# Patient Record
Sex: FEMALE | Race: WHITE | Employment: FULL TIME | URBAN - METROPOLITAN AREA
[De-identification: names, ages, dates, MRNs, and addresses within clinical notes are randomized per-mention and may not be internally consistent; named-entity substitution may affect disease eponyms.]

---

## 2018-02-26 PROBLEM — I10 HTN (HYPERTENSION): Status: ACTIVE | Noted: 2018-02-26

## 2018-02-26 PROBLEM — I31.39 PERICARDIAL EFFUSION: Status: ACTIVE | Noted: 2018-02-26

## 2018-02-27 PROBLEM — R09.89 BRUIT OF RIGHT CAROTID ARTERY: Status: ACTIVE | Noted: 2018-02-27

## 2018-02-27 PROBLEM — R07.9 CHEST PAIN: Status: ACTIVE | Noted: 2018-02-27

## 2020-08-07 ENCOUNTER — HOSPITAL ENCOUNTER (OUTPATIENT)
Age: 75
Discharge: HOME | End: 2020-08-09

## 2020-08-07 PROCEDURE — 87081 CULTURE SCREEN ONLY: CPT

## 2020-08-07 PROCEDURE — 88305 TISSUE EXAM BY PATHOLOGIST: CPT

## 2020-08-08 LAB — CLOTEST: NORMAL

## 2023-01-10 ENCOUNTER — HOSPITAL ENCOUNTER (OUTPATIENT)
Age: 78
Discharge: HOME | End: 2023-01-12

## 2023-02-28 ENCOUNTER — HOSPITAL ENCOUNTER (INPATIENT)
Age: 78
LOS: 1 days | Discharge: HOME | DRG: 312 | End: 2023-03-01
Attending: EMERGENCY MEDICINE | Admitting: FAMILY MEDICINE
Payer: MEDICARE

## 2023-02-28 ENCOUNTER — APPOINTMENT (OUTPATIENT)
Dept: GENERAL RADIOLOGY | Age: 78
DRG: 312 | End: 2023-02-28
Payer: MEDICARE

## 2023-02-28 ENCOUNTER — APPOINTMENT (OUTPATIENT)
Dept: CT IMAGING | Age: 78
DRG: 312 | End: 2023-02-28
Payer: MEDICARE

## 2023-02-28 DIAGNOSIS — E87.1 HYPONATREMIA: Primary | ICD-10-CM

## 2023-02-28 DIAGNOSIS — E87.6 HYPOKALEMIA: ICD-10-CM

## 2023-02-28 DIAGNOSIS — R55 SYNCOPE AND COLLAPSE: ICD-10-CM

## 2023-02-28 PROBLEM — R53.1 WEAKNESS: Status: ACTIVE | Noted: 2023-02-28

## 2023-02-28 PROBLEM — E87.8 ELECTROLYTE ABNORMALITY: Status: ACTIVE | Noted: 2023-02-28

## 2023-02-28 LAB
ANION GAP SERPL CALCULATED.3IONS-SCNC: 7 MMOL/L (ref 7–16)
ANION GAP SERPL CALCULATED.3IONS-SCNC: 9 MMOL/L (ref 7–16)
BASOPHILS ABSOLUTE: 0.03 E9/L (ref 0–0.2)
BASOPHILS RELATIVE PERCENT: 0.5 % (ref 0–2)
BUN BLDV-MCNC: 11 MG/DL (ref 6–23)
BUN BLDV-MCNC: 14 MG/DL (ref 6–23)
CALCIUM SERPL-MCNC: 8 MG/DL (ref 8.6–10.2)
CALCIUM SERPL-MCNC: 8.9 MG/DL (ref 8.6–10.2)
CHLORIDE BLD-SCNC: 91 MMOL/L (ref 98–107)
CHLORIDE BLD-SCNC: 98 MMOL/L (ref 98–107)
CO2: 24 MMOL/L (ref 22–29)
CO2: 30 MMOL/L (ref 22–29)
CREAT SERPL-MCNC: 0.7 MG/DL (ref 0.5–1)
CREAT SERPL-MCNC: 0.9 MG/DL (ref 0.5–1)
D DIMER: 520 NG/ML DDU
EKG ATRIAL RATE: 58 BPM
EKG P AXIS: 36 DEGREES
EKG P-R INTERVAL: 188 MS
EKG Q-T INTERVAL: 490 MS
EKG QRS DURATION: 90 MS
EKG QTC CALCULATION (BAZETT): 481 MS
EKG R AXIS: 34 DEGREES
EKG T AXIS: 51 DEGREES
EKG VENTRICULAR RATE: 58 BPM
EOSINOPHILS ABSOLUTE: 0.01 E9/L (ref 0.05–0.5)
EOSINOPHILS RELATIVE PERCENT: 0.2 % (ref 0–6)
GFR SERPL CREATININE-BSD FRML MDRD: >60 ML/MIN/1.73
GFR SERPL CREATININE-BSD FRML MDRD: >60 ML/MIN/1.73
GLUCOSE BLD-MCNC: 102 MG/DL (ref 74–99)
GLUCOSE BLD-MCNC: 193 MG/DL (ref 74–99)
HCT VFR BLD CALC: 37.7 % (ref 34–48)
HEMOGLOBIN: 13 G/DL (ref 11.5–15.5)
IMMATURE GRANULOCYTES #: 0.03 E9/L
IMMATURE GRANULOCYTES %: 0.5 % (ref 0–5)
LYMPHOCYTES ABSOLUTE: 0.9 E9/L (ref 1.5–4)
LYMPHOCYTES RELATIVE PERCENT: 13.7 % (ref 20–42)
MAGNESIUM: 1.7 MG/DL (ref 1.6–2.6)
MCH RBC QN AUTO: 31 PG (ref 26–35)
MCHC RBC AUTO-ENTMCNC: 34.5 % (ref 32–34.5)
MCV RBC AUTO: 89.8 FL (ref 80–99.9)
MONOCYTES ABSOLUTE: 0.69 E9/L (ref 0.1–0.95)
MONOCYTES RELATIVE PERCENT: 10.5 % (ref 2–12)
NEUTROPHILS ABSOLUTE: 4.93 E9/L (ref 1.8–7.3)
NEUTROPHILS RELATIVE PERCENT: 74.6 % (ref 43–80)
PDW BLD-RTO: 12.3 FL (ref 11.5–15)
PLATELET # BLD: 195 E9/L (ref 130–450)
PMV BLD AUTO: 9.3 FL (ref 7–12)
POTASSIUM SERPL-SCNC: 3.1 MMOL/L (ref 3.5–5)
POTASSIUM SERPL-SCNC: 3.2 MMOL/L (ref 3.5–5)
RBC # BLD: 4.2 E12/L (ref 3.5–5.5)
SODIUM BLD-SCNC: 128 MMOL/L (ref 132–146)
SODIUM BLD-SCNC: 131 MMOL/L (ref 132–146)
TROPONIN, HIGH SENSITIVITY: 7 NG/L (ref 0–9)
WBC # BLD: 6.6 E9/L (ref 4.5–11.5)

## 2023-02-28 PROCEDURE — 2580000003 HC RX 258: Performed by: FAMILY MEDICINE

## 2023-02-28 PROCEDURE — 71045 X-RAY EXAM CHEST 1 VIEW: CPT

## 2023-02-28 PROCEDURE — 84484 ASSAY OF TROPONIN QUANT: CPT

## 2023-02-28 PROCEDURE — 6360000002 HC RX W HCPCS: Performed by: EMERGENCY MEDICINE

## 2023-02-28 PROCEDURE — 2060000000 HC ICU INTERMEDIATE R&B

## 2023-02-28 PROCEDURE — 80048 BASIC METABOLIC PNL TOTAL CA: CPT

## 2023-02-28 PROCEDURE — 83735 ASSAY OF MAGNESIUM: CPT

## 2023-02-28 PROCEDURE — 93005 ELECTROCARDIOGRAM TRACING: CPT | Performed by: EMERGENCY MEDICINE

## 2023-02-28 PROCEDURE — 70450 CT HEAD/BRAIN W/O DYE: CPT

## 2023-02-28 PROCEDURE — 36415 COLL VENOUS BLD VENIPUNCTURE: CPT

## 2023-02-28 PROCEDURE — 99285 EMERGENCY DEPT VISIT HI MDM: CPT

## 2023-02-28 PROCEDURE — 93010 ELECTROCARDIOGRAM REPORT: CPT | Performed by: INTERNAL MEDICINE

## 2023-02-28 PROCEDURE — 6370000000 HC RX 637 (ALT 250 FOR IP): Performed by: FAMILY MEDICINE

## 2023-02-28 PROCEDURE — 85025 COMPLETE CBC W/AUTO DIFF WBC: CPT

## 2023-02-28 PROCEDURE — 6370000000 HC RX 637 (ALT 250 FOR IP): Performed by: EMERGENCY MEDICINE

## 2023-02-28 PROCEDURE — 2580000003 HC RX 258: Performed by: EMERGENCY MEDICINE

## 2023-02-28 PROCEDURE — 85378 FIBRIN DEGRADE SEMIQUANT: CPT

## 2023-02-28 RX ORDER — ROSUVASTATIN CALCIUM 20 MG/1
20 TABLET, COATED ORAL NIGHTLY
COMMUNITY

## 2023-02-28 RX ORDER — MAGNESIUM SULFATE IN WATER 40 MG/ML
2000 INJECTION, SOLUTION INTRAVENOUS ONCE
Status: COMPLETED | OUTPATIENT
Start: 2023-02-28 | End: 2023-02-28

## 2023-02-28 RX ORDER — ASPIRIN 81 MG/1
81 TABLET ORAL EVERY MORNING
Status: DISCONTINUED | OUTPATIENT
Start: 2023-03-01 | End: 2023-03-01 | Stop reason: HOSPADM

## 2023-02-28 RX ORDER — MULTIVITAMIN WITH IRON
1 TABLET ORAL DAILY
Status: DISCONTINUED | OUTPATIENT
Start: 2023-02-28 | End: 2023-03-01 | Stop reason: HOSPADM

## 2023-02-28 RX ORDER — POTASSIUM CHLORIDE 20 MEQ/1
40 TABLET, EXTENDED RELEASE ORAL ONCE
Status: COMPLETED | OUTPATIENT
Start: 2023-02-28 | End: 2023-02-28

## 2023-02-28 RX ORDER — PANTOPRAZOLE SODIUM 40 MG/1
40 TABLET, DELAYED RELEASE ORAL
Status: DISCONTINUED | OUTPATIENT
Start: 2023-03-01 | End: 2023-03-01 | Stop reason: HOSPADM

## 2023-02-28 RX ORDER — BUSPIRONE HYDROCHLORIDE 5 MG/1
7.5 TABLET ORAL 2 TIMES DAILY
Status: DISCONTINUED | OUTPATIENT
Start: 2023-02-28 | End: 2023-03-01 | Stop reason: HOSPADM

## 2023-02-28 RX ORDER — SODIUM CHLORIDE 9 MG/ML
INJECTION, SOLUTION INTRAVENOUS CONTINUOUS
Status: DISCONTINUED | OUTPATIENT
Start: 2023-02-28 | End: 2023-03-01 | Stop reason: HOSPADM

## 2023-02-28 RX ORDER — POTASSIUM CHLORIDE 7.45 MG/ML
10 INJECTION INTRAVENOUS ONCE
Status: COMPLETED | OUTPATIENT
Start: 2023-02-28 | End: 2023-02-28

## 2023-02-28 RX ORDER — 0.9 % SODIUM CHLORIDE 0.9 %
2000 INTRAVENOUS SOLUTION INTRAVENOUS ONCE
Status: COMPLETED | OUTPATIENT
Start: 2023-02-28 | End: 2023-02-28

## 2023-02-28 RX ORDER — CITALOPRAM 20 MG/1
10 TABLET ORAL NIGHTLY
Status: DISCONTINUED | OUTPATIENT
Start: 2023-02-28 | End: 2023-03-01 | Stop reason: HOSPADM

## 2023-02-28 RX ORDER — OMEPRAZOLE 40 MG/1
40 CAPSULE, DELAYED RELEASE ORAL EVERY MORNING
COMMUNITY

## 2023-02-28 RX ORDER — VITAMIN B COMPLEX
100 TABLET ORAL EVERY MORNING
Status: DISCONTINUED | OUTPATIENT
Start: 2023-03-01 | End: 2023-02-28 | Stop reason: DRUGHIGH

## 2023-02-28 RX ORDER — MULTIVIT-MIN/FOLIC ACID/BIOTIN 200-300MCG
1 TABLET,CHEWABLE ORAL EVERY MORNING
Status: DISCONTINUED | OUTPATIENT
Start: 2023-03-01 | End: 2023-02-28 | Stop reason: CLARIF

## 2023-02-28 RX ORDER — BUSPIRONE HYDROCHLORIDE 15 MG/1
7.5 TABLET ORAL 2 TIMES DAILY
COMMUNITY

## 2023-02-28 RX ORDER — MULTIVIT-MIN/FOLIC ACID/BIOTIN 200-300MCG
1 TABLET,CHEWABLE ORAL EVERY MORNING
COMMUNITY

## 2023-02-28 RX ORDER — CARVEDILOL 6.25 MG/1
12.5 TABLET ORAL 2 TIMES DAILY
Status: DISCONTINUED | OUTPATIENT
Start: 2023-02-28 | End: 2023-03-01 | Stop reason: HOSPADM

## 2023-02-28 RX ORDER — ROSUVASTATIN CALCIUM 20 MG/1
20 TABLET, COATED ORAL NIGHTLY
Status: DISCONTINUED | OUTPATIENT
Start: 2023-02-28 | End: 2023-03-01 | Stop reason: HOSPADM

## 2023-02-28 RX ORDER — ASPIRIN 81 MG/1
81 TABLET ORAL EVERY MORNING
COMMUNITY

## 2023-02-28 RX ORDER — VITAMIN B COMPLEX
100 TABLET ORAL EVERY MORNING
COMMUNITY

## 2023-02-28 RX ORDER — CITALOPRAM 10 MG/1
10 TABLET ORAL NIGHTLY
COMMUNITY

## 2023-02-28 RX ADMIN — CARVEDILOL 12.5 MG: 6.25 TABLET, FILM COATED ORAL at 20:47

## 2023-02-28 RX ADMIN — POTASSIUM CHLORIDE 10 MEQ: 7.46 INJECTION, SOLUTION INTRAVENOUS at 14:46

## 2023-02-28 RX ADMIN — POTASSIUM CHLORIDE 40 MEQ: 1500 TABLET, EXTENDED RELEASE ORAL at 14:35

## 2023-02-28 RX ADMIN — MULTIVITAMIN TABLET 1 TABLET: TABLET at 18:16

## 2023-02-28 RX ADMIN — SODIUM CHLORIDE: 9 INJECTION, SOLUTION INTRAVENOUS at 18:11

## 2023-02-28 RX ADMIN — MAGNESIUM SULFATE HEPTAHYDRATE 2000 MG: 40 INJECTION, SOLUTION INTRAVENOUS at 14:43

## 2023-02-28 RX ADMIN — POTASSIUM CHLORIDE 10 MEQ: 7.46 INJECTION, SOLUTION INTRAVENOUS at 17:33

## 2023-02-28 RX ADMIN — BUSPIRONE HYDROCHLORIDE 7.5 MG: 5 TABLET ORAL at 18:14

## 2023-02-28 RX ADMIN — CITALOPRAM HYDROBROMIDE 10 MG: 20 TABLET ORAL at 20:47

## 2023-02-28 RX ADMIN — ROSUVASTATIN CALCIUM 20 MG: 20 TABLET, FILM COATED ORAL at 20:47

## 2023-02-28 RX ADMIN — SODIUM CHLORIDE 2000 ML: 9 INJECTION, SOLUTION INTRAVENOUS at 11:39

## 2023-02-28 RX ADMIN — POTASSIUM CHLORIDE 10 MEQ: 7.46 INJECTION, SOLUTION INTRAVENOUS at 16:00

## 2023-02-28 ASSESSMENT — PAIN - FUNCTIONAL ASSESSMENT
PAIN_FUNCTIONAL_ASSESSMENT: NONE - DENIES PAIN
PAIN_FUNCTIONAL_ASSESSMENT: NONE - DENIES PAIN

## 2023-02-28 ASSESSMENT — LIFESTYLE VARIABLES
HOW OFTEN DO YOU HAVE A DRINK CONTAINING ALCOHOL: NEVER
HOW MANY STANDARD DRINKS CONTAINING ALCOHOL DO YOU HAVE ON A TYPICAL DAY: PATIENT DOES NOT DRINK

## 2023-02-28 NOTE — ED PROVIDER NOTES
HPI:  2/28/23,   Time: 11:15 AM DERICK Plummer is a 69 y.o. female presenting to the ED for Dizziness and syncope X 2 , beginning 2 days ago. The complaint has been persistent, mild in severity, and worsened by light exertion. Patient states she was out walking when she became lightheaded dizzy had 2 syncopal events she does not recall what happened afterwards. These were witnessed by family. Patient's had decreased oral intake per daughter. She has no fevers chills or melena. She does report some loose stool/diarrhea. No close contacts are ill. She denies chest pain shortness of breath pleuritic pain or abdominal pain. Review of Systems:   Pertinent positives and negatives are stated within HPI, all other systems reviewed and are negative.    --------------------------------------------- PAST HISTORY ---------------------------------------------  Past Medical History:  has a past medical history of Carotid stenosis, bilateral, Hypertension, and Left carotid stenosis. Past Surgical History:  has a past surgical history that includes Appendectomy; Hysterectomy; Foot surgery; Tonsillectomy; Neck surgery; hernia repair (2015); and shoulder surgery (Right, 12/2015). Social History:  reports that she quit smoking about 15 years ago. Her smoking use included cigarettes. She has a 10.00 pack-year smoking history. She has never used smokeless tobacco. She reports that she does not drink alcohol and does not use drugs. Family History: family history is not on file. The patients home medications have been reviewed. Allergies: Patient has no known allergies.     ---------------------------------------------------PHYSICAL EXAM--------------------------------------    Constitutional/General: Alert and oriented x3, well appearing, non toxic in NAD  Head: Normocephalic and atraumatic  Eyes: PERRL, EOMI, conjunctive normal, sclera non icteric  Mouth: Oropharynx clear, handling secretions, no trismus, no asymmetry of the posterior oropharynx or uvular edema  Neck: Supple, full ROM, non tender to palpation in the midline, no stridor, no crepitus, no meningeal signs  Respiratory: Lungs clear to auscultation bilaterally, no wheezes, rales, or rhonchi. Not in respiratory distress  Cardiovascular:  Regular rate. Regular rhythm. No murmurs, gallops, or rubs. 2+ distal pulses  Chest: No chest wall tenderness  GI:  Abdomen Soft, Non tender, Non distended. +BS. No organomegaly, no palpable masses,  No rebound, guarding, or rigidity. Musculoskeletal: Moves all extremities x 4. Warm and well perfused, no clubbing, cyanosis, or edema. Capillary refill <3 seconds  Integument: skin warm and dry. No rashes. Lymphatic: no lymphadenopathy noted  Neurologic: GCS 15, no focal deficits, symmetric strength 5/5 in the upper and lower extremities bilaterally  Psychiatric: Normal Affect    -------------------------------------------------- RESULTS -------------------------------------------------  I have personally reviewed all laboratory and imaging results for this patient. Results are listed below.      LABS:  Results for orders placed or performed during the hospital encounter of 02/28/23   CBC with Auto Differential   Result Value Ref Range    WBC 6.6 4.5 - 11.5 E9/L    RBC 4.20 3.50 - 5.50 E12/L    Hemoglobin 13.0 11.5 - 15.5 g/dL    Hematocrit 37.7 34.0 - 48.0 %    MCV 89.8 80.0 - 99.9 fL    MCH 31.0 26.0 - 35.0 pg    MCHC 34.5 32.0 - 34.5 %    RDW 12.3 11.5 - 15.0 fL    Platelets 765 667 - 128 E9/L    MPV 9.3 7.0 - 12.0 fL    Neutrophils % 74.6 43.0 - 80.0 %    Immature Granulocytes % 0.5 0.0 - 5.0 %    Lymphocytes % 13.7 (L) 20.0 - 42.0 %    Monocytes % 10.5 2.0 - 12.0 %    Eosinophils % 0.2 0.0 - 6.0 %    Basophils % 0.5 0.0 - 2.0 %    Neutrophils Absolute 4.93 1.80 - 7.30 E9/L    Immature Granulocytes # 0.03 E9/L    Lymphocytes Absolute 0.90 (L) 1.50 - 4.00 E9/L    Monocytes Absolute 0.69 0.10 - 0.95 E9/L Eosinophils Absolute 0.01 (L) 0.05 - 0.50 E9/L    Basophils Absolute 0.03 0.00 - 0.20 E9/L   Troponin   Result Value Ref Range    Troponin, High Sensitivity 7 0 - 9 ng/L   Basic Metabolic Panel   Result Value Ref Range    Sodium 128 (L) 132 - 146 mmol/L    Potassium 3.1 (L) 3.5 - 5.0 mmol/L    Chloride 91 (L) 98 - 107 mmol/L    CO2 30 (H) 22 - 29 mmol/L    Anion Gap 7 7 - 16 mmol/L    Glucose 102 (H) 74 - 99 mg/dL    BUN 14 6 - 23 mg/dL    Creatinine 0.9 0.5 - 1.0 mg/dL    Est, Glom Filt Rate >60 >=60 mL/min/1.73    Calcium 8.9 8.6 - 10.2 mg/dL   D-Dimer, Quantitative   Result Value Ref Range    D-Dimer, Quant 520 ng/mL DDU   Magnesium   Result Value Ref Range    Magnesium 1.7 1.6 - 2.6 mg/dL   Basic Metabolic Panel   Result Value Ref Range    Sodium 131 (L) 132 - 146 mmol/L    Potassium 3.2 (L) 3.5 - 5.0 mmol/L    Chloride 98 98 - 107 mmol/L    CO2 24 22 - 29 mmol/L    Anion Gap 9 7 - 16 mmol/L    Glucose 193 (H) 74 - 99 mg/dL    BUN 11 6 - 23 mg/dL    Creatinine 0.7 0.5 - 1.0 mg/dL    Est, Glom Filt Rate >60 >=60 mL/min/1.73    Calcium 8.0 (L) 8.6 - 10.2 mg/dL   Basic Metabolic Panel   Result Value Ref Range    Sodium 132 132 - 146 mmol/L    Potassium 3.0 (L) 3.5 - 5.0 mmol/L    Chloride 99 98 - 107 mmol/L    CO2 24 22 - 29 mmol/L    Anion Gap 9 7 - 16 mmol/L    Glucose 92 74 - 99 mg/dL    BUN 9 6 - 23 mg/dL    Creatinine 0.7 0.5 - 1.0 mg/dL    Est, Glom Filt Rate >60 >=60 mL/min/1.73    Calcium 7.7 (L) 8.6 - 10.2 mg/dL   EKG 12 Lead   Result Value Ref Range    Ventricular Rate 58 BPM    Atrial Rate 58 BPM    P-R Interval 188 ms    QRS Duration 90 ms    Q-T Interval 490 ms    QTc Calculation (Bazett) 481 ms    P Axis 36 degrees    R Axis 34 degrees    T Axis 51 degrees       RADIOLOGY:  Interpreted by Radiologist.  XR CHEST PORTABLE   Final Result   1. Cardiomegaly. There are no findings of failure or pneumonia. CT HEAD WO CONTRAST   Final Result   1. There is no acute intracranial abnormality. Specifically, there is no   intracranial hemorrhage. 2. Atrophy and periventricular leukomalacia,         US CAROTID ARTERY BILATERAL    (Results Pending)       EKG: This EKG is signed and interpreted by the EP, Dr. Franny Maldonado     Time: 1059  Rate: 58  Rhythm: Sinus  Interpretation: no acute changes and non-specific EKG  Comparison: stable as compared to patient's most recent EKG      ------------------------- NURSING NOTES AND VITALS REVIEWED ---------------------------   The nursing notes within the ED encounter and vital signs as below have been reviewed by myself. BP (!) 155/88   Pulse 62   Temp 98.6 °F (37 °C) (Oral)   Resp 16   Ht 5' 5\" (1.651 m)   Wt 168 lb 4.8 oz (76.3 kg)   SpO2 97%   BMI 28.01 kg/m²   Oxygen Saturation Interpretation: Normal    The patients available past medical records and past encounters were reviewed.         ------------------------------ ED COURSE/MEDICAL DECISION MAKING----------------------  Medications   aspirin EC tablet 81 mg (81 mg Oral Given 3/1/23 0747)   busPIRone (BUSPAR) tablet 7.5 mg (7.5 mg Oral Given 3/1/23 0747)   carvedilol (COREG) tablet 12.5 mg (12.5 mg Oral Given 3/1/23 0746)   citalopram (CELEXA) tablet 10 mg (10 mg Oral Given 2/28/23 2047)   pantoprazole (PROTONIX) tablet 40 mg (40 mg Oral Given 3/1/23 0625)   rosuvastatin (CRESTOR) tablet 20 mg (20 mg Oral Given 2/28/23 2047)   0.9 % sodium chloride infusion (0 mL/hr IntraVENous Stopped 3/1/23 1017)   multivitamin 1 tablet (1 tablet Oral Given 3/1/23 0748)   0.9 % sodium chloride bolus (0 mLs IntraVENous Stopped 2/28/23 1559)   potassium chloride 10 mEq/100 mL IVPB (Peripheral Line) ( IntraVENous Rate/Dose Change 2/28/23 1600)   potassium chloride 10 mEq/100 mL IVPB (Peripheral Line) ( IntraVENous Stopped 2/28/23 2042)   potassium chloride 10 mEq/100 mL IVPB (Peripheral Line) ( IntraVENous Rate/Dose Change 2/28/23 1731)   potassium chloride (KLOR-CON M) extended release tablet 40 mEq (40 mEq Oral Given 23 1435)   magnesium sulfate 2000 mg in 50 mL IVPB premix (0 mg IntraVENous Stopped 23 1658)   potassium chloride (KLOR-CON M) extended release tablet 40 mEq (40 mEq Oral Given 3/1/23 0747)             Medical Decision Makin yo F presents with daughter had to help syncopal events since Saturday. Complains of generalized weakness had some watery diarrhea. Did not hit her head. This was witnessed. She denies chest pain. History From: Daughter and patient    CC/HPI Summary, DDx, ED Course, Reassessment, Tests Considered, Patient expectation:   Differential diagnosis includes but not limited to seizure, arrhythmia, orthostasis was patient was orthostatic positive. Cute coronary syndrome. Social Determinants affecting Dx or Tx: Patient has no social determinants    Chronic Conditions: Include rotted stenosis and hypertension    Records Reviewed: 3/1/2018 patient had 2D echocardiogram, scheduled by Dr. Nathalia Farris. Was not completed. 2018 patient did have a nuclear medicine myocardial SPECT exam: No evidence of stress-induced myocardial ischemia noted. Ejection fraction was 65%. I am the Primary Clinician of Record. DISPOSITION: Labs were ordered and reviewed by me. Patient was concern for hypokalemia potassium 3.0 she was given IV and oral potassium. Calcium was also low at 7.7. He was also found to have hyponatremia with a sodium of 128. Patient be admitted to telemetry for electrolyte imbalance hyponatremia and fatigue as well as syncopal episode and orthostatic positive. ED COURSE:  ED Course as of 23 1025   e 2023   141 Discussed admission with Dr. Venkata Lance. [JN]   6418 Patient's potassium was replaced. Initial potassium was 3.1. Sodium was 128. Her CBC was unremarkable. Patient's vital signs remained stable. She will be admitted to telemetry.  [JN]      ED Course User Index  [JN] Liborio Madrid, DO       This patient's ED course included: a personal history and physicial examination, re-evaluation prior to disposition, multiple bedside re-evaluations, IV medications, cardiac monitoring, continuous pulse oximetry, and complex medical decision making and emergency management    This patient has remained hemodynamically stable, improved, and been closely monitored during their ED course. Re-Evaluations:             Re-evaluation. Patients symptoms are improving    Re-examination  2/28/23   11:15 AM EST    Consultations:             Dr. Madhu Bacon: NONE    Counseling: The emergency provider has spoken with the patient and family member patient and daughter and discussed todays results, in addition to providing specific details for the plan of care and counseling regarding the diagnosis and prognosis. Questions are answered at this time and they are agreeable with the plan.       --------------------------------- IMPRESSION AND DISPOSITION ---------------------------------    IMPRESSION  1. Hyponatremia    2. Syncope and collapse    3. Hypokalemia        DISPOSITION  Disposition: Admit to telemetry  Patient condition is serious    NOTE: This report was transcribed using voice recognition software.  Every effort was made to ensure accuracy; however, inadvertent computerized transcription errors may be present       Tamiko Womack DO  03/01/23 1025

## 2023-02-28 NOTE — ED NOTES
ED to Inpatient Handoff Report    Notified Meena Hook that electronic handoff available and patient ready for transport to room 607. Safety Risks: None identified    Patient in Restraints: no    Constant Observer or Patient : no    Telemetry Monitoring Ordered :Yes           Order to transfer to unit without monitor:NO    Last MEWS: 1 Time completed: 1452    Vitals:    02/28/23 0921 02/28/23 1007 02/28/23 1141 02/28/23 1437   BP: (!) 127/115 123/84 125/80 (!) 164/80   Pulse: 85 57 59 65   Resp: 14 16 20 20   Temp: 98.5 °F (36.9 °C)   98.1 °F (36.7 °C)   TempSrc: Oral   Oral   SpO2: 95% 94% 98% 95%   Weight: 160 lb (72.6 kg)      Height: 5' 4\" (1.626 m)          Opportunity for questions and clarification was provided.         Pretty Blanco RN  02/28/23 9043

## 2023-02-28 NOTE — PROGRESS NOTES
Database initiated. Patient is A&O independent from home with sister. States she uses no assistive devices and is RA at baseline.

## 2023-02-28 NOTE — PROGRESS NOTES
Pharmacy Note    Karla Foley was ordered Co-Enzyme Q-10 capsules. As per the [address] [place] [address], herbals and certain dietary supplements will be discontinued.   The herbal or dietary supplement may be continued after discharge from the hospital.

## 2023-03-01 ENCOUNTER — APPOINTMENT (OUTPATIENT)
Dept: ULTRASOUND IMAGING | Age: 78
DRG: 312 | End: 2023-03-01
Payer: MEDICARE

## 2023-03-01 VITALS
HEIGHT: 65 IN | WEIGHT: 168.3 LBS | TEMPERATURE: 98.6 F | HEART RATE: 62 BPM | DIASTOLIC BLOOD PRESSURE: 88 MMHG | OXYGEN SATURATION: 97 % | RESPIRATION RATE: 16 BRPM | BODY MASS INDEX: 28.04 KG/M2 | SYSTOLIC BLOOD PRESSURE: 155 MMHG

## 2023-03-01 LAB
ANION GAP SERPL CALCULATED.3IONS-SCNC: 6 MMOL/L (ref 7–16)
ANION GAP SERPL CALCULATED.3IONS-SCNC: 9 MMOL/L (ref 7–16)
BUN BLDV-MCNC: 10 MG/DL (ref 6–23)
BUN BLDV-MCNC: 9 MG/DL (ref 6–23)
CALCIUM SERPL-MCNC: 7.7 MG/DL (ref 8.6–10.2)
CALCIUM SERPL-MCNC: 8.2 MG/DL (ref 8.6–10.2)
CHLORIDE BLD-SCNC: 101 MMOL/L (ref 98–107)
CHLORIDE BLD-SCNC: 99 MMOL/L (ref 98–107)
CO2: 24 MMOL/L (ref 22–29)
CO2: 26 MMOL/L (ref 22–29)
CREAT SERPL-MCNC: 0.7 MG/DL (ref 0.5–1)
CREAT SERPL-MCNC: 0.7 MG/DL (ref 0.5–1)
GFR SERPL CREATININE-BSD FRML MDRD: >60 ML/MIN/1.73
GFR SERPL CREATININE-BSD FRML MDRD: >60 ML/MIN/1.73
GLUCOSE BLD-MCNC: 128 MG/DL (ref 74–99)
GLUCOSE BLD-MCNC: 92 MG/DL (ref 74–99)
POTASSIUM SERPL-SCNC: 3 MMOL/L (ref 3.5–5)
POTASSIUM SERPL-SCNC: 3.2 MMOL/L (ref 3.5–5)
SODIUM BLD-SCNC: 132 MMOL/L (ref 132–146)
SODIUM BLD-SCNC: 133 MMOL/L (ref 132–146)

## 2023-03-01 PROCEDURE — 6370000000 HC RX 637 (ALT 250 FOR IP): Performed by: FAMILY MEDICINE

## 2023-03-01 PROCEDURE — 93880 EXTRACRANIAL BILAT STUDY: CPT

## 2023-03-01 PROCEDURE — 80048 BASIC METABOLIC PNL TOTAL CA: CPT

## 2023-03-01 PROCEDURE — 36415 COLL VENOUS BLD VENIPUNCTURE: CPT

## 2023-03-01 RX ORDER — POTASSIUM CHLORIDE 20 MEQ/1
40 TABLET, EXTENDED RELEASE ORAL ONCE
Status: COMPLETED | OUTPATIENT
Start: 2023-03-01 | End: 2023-03-01

## 2023-03-01 RX ORDER — POTASSIUM CHLORIDE 20 MEQ/1
40 TABLET, EXTENDED RELEASE ORAL DAILY
Qty: 60 TABLET | Refills: 5 | Status: SHIPPED | OUTPATIENT
Start: 2023-03-01 | End: 2023-03-01 | Stop reason: SDUPTHER

## 2023-03-01 RX ORDER — POTASSIUM CHLORIDE 20 MEQ/1
20 TABLET, EXTENDED RELEASE ORAL ONCE
Status: DISCONTINUED | OUTPATIENT
Start: 2023-03-01 | End: 2023-03-01

## 2023-03-01 RX ORDER — VALSARTAN 160 MG/1
160 TABLET ORAL DAILY
Qty: 30 TABLET | Refills: 5 | Status: SHIPPED | OUTPATIENT
Start: 2023-03-01

## 2023-03-01 RX ORDER — VALSARTAN 160 MG/1
160 TABLET ORAL DAILY
Qty: 30 TABLET | Refills: 5 | Status: SHIPPED | OUTPATIENT
Start: 2023-03-01 | End: 2023-03-01 | Stop reason: SDUPTHER

## 2023-03-01 RX ORDER — POTASSIUM CHLORIDE 20 MEQ/1
40 TABLET, EXTENDED RELEASE ORAL DAILY
Qty: 60 TABLET | Refills: 5 | Status: SHIPPED | OUTPATIENT
Start: 2023-03-01

## 2023-03-01 RX ADMIN — CARVEDILOL 12.5 MG: 6.25 TABLET, FILM COATED ORAL at 07:46

## 2023-03-01 RX ADMIN — PANTOPRAZOLE SODIUM 40 MG: 40 TABLET, DELAYED RELEASE ORAL at 06:25

## 2023-03-01 RX ADMIN — BUSPIRONE HYDROCHLORIDE 7.5 MG: 5 TABLET ORAL at 07:47

## 2023-03-01 RX ADMIN — POTASSIUM CHLORIDE 40 MEQ: 1500 TABLET, EXTENDED RELEASE ORAL at 07:47

## 2023-03-01 RX ADMIN — MULTIVITAMIN TABLET 1 TABLET: TABLET at 07:48

## 2023-03-01 RX ADMIN — ASPIRIN 81 MG: 81 TABLET, COATED ORAL at 07:47

## 2023-03-01 ASSESSMENT — PAIN SCALES - GENERAL: PAINLEVEL_OUTOF10: 0

## 2023-03-01 NOTE — H&P
[id number] [address]                              HISTORY AND PHYSICAL    PATIENT NAME: Jonathan Barrera                    :        1945  MED REC NO:   [id number]                            ROOM:       8809  ACCOUNT NO:   [de-identified]                           ADMIT DATE: 2023  PROVIDER:     Germain Salinas DO    CHIEF COMPLAINT:  Weakness, fatigue, and syncopal episode. HISTORY OF PRESENT ILLNESS:  This is a 60-year-old female presented [address] [place] [address] Emergency Room after having experienced a syncopal  episode on Saturday prior to this admission. The patient states that  for one week prior to this admission, she had been experiencing  increasing and persistent weakness and fatigue as well as dizziness. On  the Saturday prior to this admission, she was coming through a door,  lost consciousness, falling onto her left arm, and sustaining a  contusion. She was arousable. States that she hit head during the  fall, however, has had no persisting headache, blurred vision, ataxia,  or vertigo since the fall. Three days following the fall, she  ultimately decided to come to the emergency room for further evaluation. While in the emergency room, vital signs were performed and was  revealing her diastolic blood pressure to be elevated at 751 with  systolic at 428. She was saturating at 95% on room air. Remainder of  vital signs stable. Laboratory studies were performed including CBC,  which revealed a normal white cell count at 6.6 with hemoglobin and  hematocrit stable at 13.0 and 37.7 respectively. Her platelet cell  counts were stable at 195. Her BMP was showing abnormalities with  sodium low at 128, potassium low at 3.1, glucose slightly high at 102,  and chloride low at 91. Troponin was stable at 7. D-dimer stable at  520.   CT scan of the head revealed no acute intracranial abnormality,  specifically no intracranial hemorrhage was noted. Atrophy and  periventricular leukomalacia was noted. Chest x-ray  Was showing  cardiomegaly, however, no other acute findings of failure or pneumonia  were identified. EKG while in the emergency room was showing  nonspecific ST and T-wave abnormalities with sinus bradycardia and  lengthened QT interval.  The patient was started on IV fluids and were  given replacement of potassium and magnesium and was admitted for  observation on the basis of her syncopal episode with electrolyte  abnormalities. I was able to see her the afternoon after admission. She denies having had any preexisting chest pain, shortness of breath,  abdominal pain, nausea, vomiting, diarrhea, constipation, melena,  hematochezia, dysuria, urgency, or frequency. She has had no recent  fever or chills. Does admit to cough with chest congestion, however, no  sore throat or significant headache. The patient believes that she  developed a virus approximately one week prior to her syncopal episode  in that this had resulted in her passing out. Nonetheless, she denies  any other preexisting symptoms other than weakness, fatigue, and cough. PAST MEDICAL HISTORY:  Consists of hypertension and carotid stenosis. PAST SURGICAL HISTORY:  Appendectomy, hernia repair, hysterectomy, neck  tumor excision, shoulder surgery right side, and tonsillectomy. MEDICATIONS:  See chart. ALLERGIES:  No known drug allergies. SOCIAL HISTORY:  Former smoker of half pack per day for 10 years. No  recreational drug or alcohol abuse noted. FAMILY HISTORY:  No family history noted. REVIEW OF SYSTEMS:  Negative unless stated in above history. PHYSICAL EXAMINATION:  VITAL SIGNS:  Temperature 98.4, pulse 63, respirations 20, blood  pressure elevated now at 191/89 with oxygen saturation 98% on room air.   GENERAL:  Well-developed, well-nourished elderly female, who appears her  stated age if not younger. She is alert and oriented, answers questions  appropriately, and is friendly and willing to cooperate with physical  examination. She is presently in no acute distress. HEENT:  Atraumatic and normocephalic. Pupils are equal, round, and  reactive to light. Extraocular movements are intact. Nares are patent. External auditory canals are nonerythematous. Pharynx is clear. NECK:  Supple. No bruits. No cervical lymphadenopathy. HEART:  Regular rate and rhythm. 2/6 systolic murmur. LUNGS:  Clear to auscultation bilaterally without wheezes or rales. Few  crackles noted in upper airways. ABDOMEN:  Soft and nontender with bowel sounds present in all four  quadrants. EXTREMITIES:  Full range of motion. 5/5 strength. No edema in upper or  lower extremities bilaterally. Hematoma right distal triceps area,  however, range of motion and strength intact. NEUROLOGIC:  Cranial nerves II through XII are grossly intact. No focal  neurologic deficits noted. MUSCULOSKELETAL:  No excessive lordosis, kyphosis,  or scoliosis. No  gross bony or soft tissue asymmetry. ASSESSMENT:  1. Syncopal episode. 2.  Weakness. 3.  Electrolyte abnormalities. PLAN:  The patient will be admitted for observation. Vitals q.4 hours. Activity ad jaime. I's and O's regularly. Diet regular without any added  salt. IV fluids of normal saline at 75 per hour. We will follow BMP  every eight hours and we will check carotid ultrasound. Reassess the  patient in the a.m. She is essentially free of any symptoms other than  fatigue and therefore we would hope that rehydration will reestablish  her baseline. We will reassess in the a.m. to determine if the patient  is stable for discharge. For any further orders, please see chart.         Robyn Sutherland DO    D: 02/28/2023 17:49:37       T: 02/28/2023 17:53:32     [email or web address]  Job#: [id number]     Doc#: [id number]    CC:

## 2023-03-01 NOTE — PLAN OF CARE
Problem: Discharge Planning  Goal: Discharge to home or other facility with appropriate resources  Outcome: Progressing     Problem: Safety - Adult  Goal: Free from fall injury  Outcome: Progressing     Problem: Skin/Tissue Integrity - Adult  Goal: Skin integrity remains intact  Outcome: Progressing     Problem: Musculoskeletal - Adult  Goal: Return mobility to safest level of function  Outcome: Progressing  Goal: Return ADL status to a safe level of function  Outcome: Progressing     Problem: Metabolic/Fluid and Electrolytes - Adult  Goal: Electrolytes maintained within normal limits  Outcome: Progressing

## 2023-03-01 NOTE — PROGRESS NOTES
Admit Date: 2/28/2023  Hospital day 2    Subjective:     Patient pt resting in bed in NAD. Still tired but otherwise says she is feeling well. Denies CP, SOB, abd pain, N/V/D/C, HA, dysuria, melena, hematochezia. Family says she has not been eating well but pt denies any issues with eating including dysphagia. Objective:       No intake/output data recorded.       BP (!) 155/88   Pulse 62   Temp 98.6 °F (37 °C) (Oral)   Resp 16   Ht 5' 5\" (1.651 m)   Wt 168 lb 4.8 oz (76.3 kg)   SpO2 97%   BMI 28.01 kg/m²   General appearance: alert, appears stated age, cooperative, and no distress  Lungs: clear to auscultation bilaterally  Heart: regular rate and rhythm, S1, S2 normal, no murmur, click, rub or gallop  Abdomen: soft, non-tender; bowel sounds normal; no masses,  no organomegaly  Extremities: extremities normal, atraumatic, no cyanosis or edema  Skin: Skin color, texture, turgor normal. No rashes or lesions       Data ReviewCBC:       Recent Results (from the past 24 hour(s))   CBC with Auto Differential    Collection Time: 02/28/23 10:14 AM   Result Value Ref Range    WBC 6.6 4.5 - 11.5 E9/L    RBC 4.20 3.50 - 5.50 E12/L    Hemoglobin 13.0 11.5 - 15.5 g/dL    Hematocrit 37.7 34.0 - 48.0 %    MCV 89.8 80.0 - 99.9 fL    MCH 31.0 26.0 - 35.0 pg    MCHC 34.5 32.0 - 34.5 %    RDW 12.3 11.5 - 15.0 fL    Platelets 660 311 - 522 E9/L    MPV 9.3 7.0 - 12.0 fL    Neutrophils % 74.6 43.0 - 80.0 %    Immature Granulocytes % 0.5 0.0 - 5.0 %    Lymphocytes % 13.7 (L) 20.0 - 42.0 %    Monocytes % 10.5 2.0 - 12.0 %    Eosinophils % 0.2 0.0 - 6.0 %    Basophils % 0.5 0.0 - 2.0 %    Neutrophils Absolute 4.93 1.80 - 7.30 E9/L    Immature Granulocytes # 0.03 E9/L    Lymphocytes Absolute 0.90 (L) 1.50 - 4.00 E9/L    Monocytes Absolute 0.69 0.10 - 0.95 E9/L    Eosinophils Absolute 0.01 (L) 0.05 - 0.50 E9/L    Basophils Absolute 0.03 0.00 - 0.20 E9/L   Troponin    Collection Time: 02/28/23 10:14 AM   Result Value Ref Range Troponin, High Sensitivity 7 0 - 9 ng/L   Basic Metabolic Panel    Collection Time: 02/28/23 10:14 AM   Result Value Ref Range    Sodium 128 (L) 132 - 146 mmol/L    Potassium 3.1 (L) 3.5 - 5.0 mmol/L    Chloride 91 (L) 98 - 107 mmol/L    CO2 30 (H) 22 - 29 mmol/L    Anion Gap 7 7 - 16 mmol/L    Glucose 102 (H) 74 - 99 mg/dL    BUN 14 6 - 23 mg/dL    Creatinine 0.9 0.5 - 1.0 mg/dL    Est, Glom Filt Rate >60 >=60 mL/min/1.73    Calcium 8.9 8.6 - 10.2 mg/dL   D-Dimer, Quantitative    Collection Time: 02/28/23 10:14 AM   Result Value Ref Range    D-Dimer, Quant 520 ng/mL DDU   EKG 12 Lead    Collection Time: 02/28/23 10:59 AM   Result Value Ref Range    Ventricular Rate 58 BPM    Atrial Rate 58 BPM    P-R Interval 188 ms    QRS Duration 90 ms    Q-T Interval 490 ms    QTc Calculation (Bazett) 481 ms    P Axis 36 degrees    R Axis 34 degrees    T Axis 51 degrees   Magnesium    Collection Time: 02/28/23  2:37 PM   Result Value Ref Range    Magnesium 1.7 1.6 - 2.6 mg/dL   Basic Metabolic Panel    Collection Time: 02/28/23  6:00 PM   Result Value Ref Range    Sodium 131 (L) 132 - 146 mmol/L    Potassium 3.2 (L) 3.5 - 5.0 mmol/L    Chloride 98 98 - 107 mmol/L    CO2 24 22 - 29 mmol/L    Anion Gap 9 7 - 16 mmol/L    Glucose 193 (H) 74 - 99 mg/dL    BUN 11 6 - 23 mg/dL    Creatinine 0.7 0.5 - 1.0 mg/dL    Est, Glom Filt Rate >60 >=60 mL/min/1.73    Calcium 8.0 (L) 8.6 - 10.2 mg/dL   Basic Metabolic Panel    Collection Time: 03/01/23  2:34 AM   Result Value Ref Range    Sodium 132 132 - 146 mmol/L    Potassium 3.0 (L) 3.5 - 5.0 mmol/L    Chloride 99 98 - 107 mmol/L    CO2 24 22 - 29 mmol/L    Anion Gap 9 7 - 16 mmol/L    Glucose 92 74 - 99 mg/dL    BUN 9 6 - 23 mg/dL    Creatinine 0.7 0.5 - 1.0 mg/dL    Est, Glom Filt Rate >60 >=60 mL/min/1.73    Calcium 7.7 (L) 8.6 - 10.2 mg/dL           Assessment:     Principal Problem:    Syncope and collapse  Active Problems:    HTN (hypertension)    Weakness    Electrolyte abnormality  Resolved Problems:    * No resolved hospital problems. *      Plan: Will hold HCTZ. Restart Valsartan; she routinely uses at home. Replace K+. Ok to discharge. F/u in office 5-7 days.

## 2023-03-02 NOTE — PROGRESS NOTES
CLINICAL PHARMACY NOTE: MEDS TO BEDS    Total # of Prescriptions Filled: 2   The following medications were delivered to the patient:  Potassium ER 20   Valsartan 160 mg    Additional Documentation:

## 2023-03-06 NOTE — PROGRESS NOTES
Physician Progress Note      Therese Goetz  CSN #:                  [id number]  :                       1945  ADMIT DATE:       2023 9:16 AM  DISCH DATE:        3/1/2023 1:54 PM  RESPONDING  PROVIDER #:        Leana Isaacs DO          QUERY TEXT:    Patient admitted with syncope. noted to have + orthostatic BP and electrolyte   abnormalities. If possible, please document in progress notes and discharge   summary after study the etiology of the syncope: The medical record reflects the following:  Risk Factors: advanced age  Clinical Indicators: orthostatic BP: 136/89, 112/83, 98/69, , K 3.1,   carotid US negative, per family medicine \". ..she had been experiencing   increasing and persistent weakness and fatigue as well as dizziness. On the   Saturday prior to this admission, she was coming through a door, lost   consciousness, falling onto her left arm, and sustaining a contusion. Lawernce Roughen Lawernce Roughen Syncope   and collapse. Lawernce Roughen Lawernce Roughen Weakness. Electrolyte abnormality. Lawernce Roughen Lawernce Roughen Will hold HCTZ. Restart   Valsartan; she routinely uses at home. Replace K+. Lawernce Roughen Lawernce Roughen \"  Treatment: IVF, lab monitoring, med adjustment    Thank you,  Aisha Ordaz RN, BSN, CDIS  Clinical Documentation Integrity  [email or web address]  Options provided:  -- Syncope due to orthostatic hypotension  -- Syncope due to hypokalemia and hyponatremia  -- Syncope due to other, please specify, please specify. -- Other - I will add my own diagnosis  -- Disagree - Not applicable / Not valid  -- Disagree - Clinically unable to determine / Unknown  -- Refer to Clinical Documentation Reviewer    PROVIDER RESPONSE TEXT:    The syncope is due to orthostatic hypotension.     Query created by: Lianet Partida on 3/6/2023 7:27 AM      Electronically signed by:  Leana Isaacs DO 3/6/2023 10:52 AM

## 2024-09-08 ENCOUNTER — APPOINTMENT (OUTPATIENT)
Dept: CT IMAGING | Age: 79
End: 2024-09-08
Payer: MEDICARE

## 2024-09-08 ENCOUNTER — HOSPITAL ENCOUNTER (EMERGENCY)
Age: 79
Discharge: HOME | End: 2024-09-08
Attending: EMERGENCY MEDICINE
Payer: MEDICARE

## 2024-09-08 VITALS
WEIGHT: 175 LBS | BODY MASS INDEX: 29.16 KG/M2 | HEART RATE: 66 BPM | RESPIRATION RATE: 14 BRPM | HEIGHT: 65 IN | OXYGEN SATURATION: 99 % | SYSTOLIC BLOOD PRESSURE: 147 MMHG | TEMPERATURE: 98.5 F | DIASTOLIC BLOOD PRESSURE: 82 MMHG

## 2024-09-08 DIAGNOSIS — S20.229A CONTUSION OF BACK, UNSPECIFIED LATERALITY, INITIAL ENCOUNTER: Primary | ICD-10-CM

## 2024-09-08 PROCEDURE — 72131 CT LUMBAR SPINE W/O DYE: CPT

## 2024-09-08 PROCEDURE — 99284 EMERGENCY DEPT VISIT MOD MDM: CPT

## 2024-09-08 PROCEDURE — 6370000000 HC RX 637 (ALT 250 FOR IP): Performed by: EMERGENCY MEDICINE

## 2024-09-08 RX ORDER — OXYCODONE AND ACETAMINOPHEN 5; 325 MG/1; MG/1
1 TABLET ORAL ONCE
Status: COMPLETED | OUTPATIENT
Start: 2024-09-08 | End: 2024-09-08

## 2024-09-08 RX ORDER — OXYCODONE AND ACETAMINOPHEN 5; 325 MG/1; MG/1
1 TABLET ORAL EVERY 6 HOURS PRN
Qty: 12 TABLET | Refills: 0 | Status: SHIPPED | OUTPATIENT
Start: 2024-09-08 | End: 2024-09-11

## 2024-09-08 RX ADMIN — OXYCODONE HYDROCHLORIDE AND ACETAMINOPHEN 1 TABLET: 5; 325 TABLET ORAL at 11:22

## 2024-09-08 ASSESSMENT — PAIN - FUNCTIONAL ASSESSMENT: PAIN_FUNCTIONAL_ASSESSMENT: 0-10

## 2024-09-08 ASSESSMENT — PAIN DESCRIPTION - ORIENTATION: ORIENTATION: LOWER

## 2024-09-08 ASSESSMENT — PAIN SCALES - GENERAL
PAINLEVEL_OUTOF10: 10
PAINLEVEL_OUTOF10: 10

## 2024-09-08 ASSESSMENT — PAIN DESCRIPTION - LOCATION: LOCATION: BACK

## 2024-10-25 ENCOUNTER — HOSPITAL ENCOUNTER (EMERGENCY)
Age: 79
Discharge: HOME | End: 2024-10-25
Payer: MEDICARE

## 2024-10-25 ENCOUNTER — APPOINTMENT (OUTPATIENT)
Dept: GENERAL RADIOLOGY | Age: 79
End: 2024-10-25
Payer: MEDICARE

## 2024-10-25 VITALS
SYSTOLIC BLOOD PRESSURE: 167 MMHG | WEIGHT: 170 LBS | BODY MASS INDEX: 27.32 KG/M2 | RESPIRATION RATE: 18 BRPM | HEIGHT: 66 IN | HEART RATE: 74 BPM | OXYGEN SATURATION: 97 % | DIASTOLIC BLOOD PRESSURE: 98 MMHG | TEMPERATURE: 97.8 F

## 2024-10-25 DIAGNOSIS — S52.501A CLOSED FRACTURE OF DISTAL END OF RIGHT RADIUS, UNSPECIFIED FRACTURE MORPHOLOGY, INITIAL ENCOUNTER: Primary | ICD-10-CM

## 2024-10-25 PROCEDURE — 29125 APPL SHORT ARM SPLINT STATIC: CPT

## 2024-10-25 PROCEDURE — 99283 EMERGENCY DEPT VISIT LOW MDM: CPT

## 2024-10-25 PROCEDURE — 73110 X-RAY EXAM OF WRIST: CPT

## 2024-10-25 RX ORDER — HYDROCODONE BITARTRATE AND ACETAMINOPHEN 5; 325 MG/1; MG/1
1 TABLET ORAL EVERY 6 HOURS PRN
Qty: 12 TABLET | Refills: 0 | Status: SHIPPED | OUTPATIENT
Start: 2024-10-25 | End: 2024-10-28

## 2024-10-25 ASSESSMENT — PAIN DESCRIPTION - ORIENTATION: ORIENTATION: RIGHT

## 2024-10-25 ASSESSMENT — PAIN DESCRIPTION - LOCATION: LOCATION: WRIST

## 2024-10-25 ASSESSMENT — PAIN DESCRIPTION - DESCRIPTORS: DESCRIPTORS: THROBBING

## 2024-10-25 ASSESSMENT — PAIN SCALES - GENERAL: PAINLEVEL_OUTOF10: 5

## 2024-10-25 NOTE — ED PROVIDER NOTES
Independent ADAM Visit.               [facility] EMERGENCY DEPARTMENT  ED  Encounter Note  Admit Date/RoomTime: 10/25/2024  3:21 PM  ED Room:   NAME: Laura Nunez  : 1945  MRN: [id number]  PCP: Olu Byers DO    CHIEF COMPLAINT     Fall and Wrist Injury (right wrist pain, patient fell and caught herself with that wrist.)    HISTORY OF PRESENT ILLNESS        Laura Nunez is a 78 y.o. female who presents to the ED by private vehicle for fall with right wrist pain, beginning 2 week(s) ago. The complaint has been persistent and are moderate in severity.  The patient states she tripped and fell 2 weeks ago.  She landed on outstretched right hand and wrist.  States that she had some pain at the time but thought it would just get better by now.  She has been using her wrist splint.  Has not yet had any imaging thus far.  Arrives today to be seen for ongoing pain in her right wrist.  Denies any right elbow pain.   Reports she did not hit her head or lose consciousness    REVIEW OF SYSTEMS     Pertinent positives and negatives are stated within HPI, all other systems reviewed and are negative.    Past Medical History:  has a past medical history of Carotid stenosis, bilateral, Hypertension, and Left carotid stenosis.  Surgical History:  has a past surgical history that includes Appendectomy; Hysterectomy; Foot surgery; Tonsillectomy; Neck surgery; hernia repair (); and shoulder surgery (Right, 2015).  Social History:  reports that she quit smoking about 17 years ago. Her smoking use included cigarettes. She started smoking about 37 years ago. She has a 10 pack-year smoking history. She has never used smokeless tobacco. She reports current alcohol use. She reports that she does not use drugs.  Family History: family history is not on file.   Allergies: Patient has no known allergies.  CURRENT MEDICATIONS       Discharge Medication List as of 10/25/2024  5:03 PM  Jolie VIDES PA-C  10/25/24 1834

## 2025-03-24 ENCOUNTER — HOSPITAL ENCOUNTER (INPATIENT)
Age: 80
LOS: 2 days | Discharge: HOME | DRG: 312 | End: 2025-03-28
Attending: STUDENT IN AN ORGANIZED HEALTH CARE EDUCATION/TRAINING PROGRAM | Admitting: INTERNAL MEDICINE
Payer: MEDICARE

## 2025-03-24 ENCOUNTER — APPOINTMENT (OUTPATIENT)
Dept: CT IMAGING | Age: 80
DRG: 312 | End: 2025-03-24
Payer: MEDICARE

## 2025-03-24 ENCOUNTER — APPOINTMENT (OUTPATIENT)
Dept: GENERAL RADIOLOGY | Age: 80
DRG: 312 | End: 2025-03-24
Payer: MEDICARE

## 2025-03-24 DIAGNOSIS — R55 SYNCOPE, UNSPECIFIED SYNCOPE TYPE: Primary | ICD-10-CM

## 2025-03-24 DIAGNOSIS — R42 DIZZINESS: ICD-10-CM

## 2025-03-24 DIAGNOSIS — E87.6 HYPOKALEMIA: ICD-10-CM

## 2025-03-24 DIAGNOSIS — I31.39 PERICARDIAL EFFUSION: ICD-10-CM

## 2025-03-24 DIAGNOSIS — R55 VASOVAGAL SYNCOPE: ICD-10-CM

## 2025-03-24 LAB
ALBUMIN SERPL-MCNC: 4 G/DL (ref 3.5–5.2)
ALP SERPL-CCNC: 104 U/L (ref 35–104)
ALT SERPL-CCNC: 13 U/L (ref 0–32)
ANION GAP SERPL CALCULATED.3IONS-SCNC: 15 MMOL/L (ref 7–16)
AST SERPL-CCNC: 19 U/L (ref 0–31)
BASOPHILS # BLD: 0.04 K/UL (ref 0–0.2)
BASOPHILS NFR BLD: 1 % (ref 0–2)
BILIRUB SERPL-MCNC: 1.2 MG/DL (ref 0–1.2)
BUN SERPL-MCNC: 26 MG/DL (ref 6–23)
CALCIUM SERPL-MCNC: 8.8 MG/DL (ref 8.6–10.2)
CHLORIDE SERPL-SCNC: 102 MMOL/L (ref 98–107)
CO2 SERPL-SCNC: 22 MMOL/L (ref 22–29)
CREAT SERPL-MCNC: 0.8 MG/DL (ref 0.5–1)
EOSINOPHIL # BLD: 0.05 K/UL (ref 0.05–0.5)
EOSINOPHILS RELATIVE PERCENT: 1 % (ref 0–6)
ERYTHROCYTE [DISTWIDTH] IN BLOOD BY AUTOMATED COUNT: 13.2 % (ref 11.5–15)
GFR, ESTIMATED: 72 ML/MIN/1.73M2
GLUCOSE SERPL-MCNC: 122 MG/DL (ref 74–99)
HCT VFR BLD AUTO: 40.2 % (ref 34–48)
HGB BLD-MCNC: 13.4 G/DL (ref 11.5–15.5)
IMM GRANULOCYTES # BLD AUTO: <0.03 K/UL (ref 0–0.58)
IMM GRANULOCYTES NFR BLD: 0 % (ref 0–5)
LYMPHOCYTES NFR BLD: 1.55 K/UL (ref 1.5–4)
LYMPHOCYTES RELATIVE PERCENT: 24 % (ref 20–42)
MAGNESIUM SERPL-MCNC: 2 MG/DL (ref 1.6–2.6)
MCH RBC QN AUTO: 29.8 PG (ref 26–35)
MCHC RBC AUTO-ENTMCNC: 33.3 G/DL (ref 32–34.5)
MCV RBC AUTO: 89.3 FL (ref 80–99.9)
MONOCYTES NFR BLD: 0.63 K/UL (ref 0.1–0.95)
MONOCYTES NFR BLD: 10 % (ref 2–12)
NEUTROPHILS NFR BLD: 64 % (ref 43–80)
NEUTS SEG NFR BLD: 4.07 K/UL (ref 1.8–7.3)
PLATELET # BLD AUTO: 234 K/UL (ref 130–450)
PMV BLD AUTO: 9.6 FL (ref 7–12)
POTASSIUM SERPL-SCNC: 3.3 MMOL/L (ref 3.5–5)
PROT SERPL-MCNC: 6.3 G/DL (ref 6.4–8.3)
RBC # BLD AUTO: 4.5 M/UL (ref 3.5–5.5)
SODIUM SERPL-SCNC: 139 MMOL/L (ref 132–146)
TROPONIN I SERPL HS-MCNC: <6 NG/L (ref 0–9)
WBC OTHER # BLD: 6.4 K/UL (ref 4.5–11.5)

## 2025-03-24 PROCEDURE — 6370000000 HC RX 637 (ALT 250 FOR IP)

## 2025-03-24 PROCEDURE — G0378 HOSPITAL OBSERVATION PER HR: HCPCS

## 2025-03-24 PROCEDURE — 71046 X-RAY EXAM CHEST 2 VIEWS: CPT

## 2025-03-24 PROCEDURE — 85025 COMPLETE CBC W/AUTO DIFF WBC: CPT

## 2025-03-24 PROCEDURE — 70450 CT HEAD/BRAIN W/O DYE: CPT

## 2025-03-24 PROCEDURE — 99285 EMERGENCY DEPT VISIT HI MDM: CPT

## 2025-03-24 PROCEDURE — 80053 COMPREHEN METABOLIC PANEL: CPT

## 2025-03-24 PROCEDURE — 2580000003 HC RX 258

## 2025-03-24 PROCEDURE — 84484 ASSAY OF TROPONIN QUANT: CPT

## 2025-03-24 PROCEDURE — 83735 ASSAY OF MAGNESIUM: CPT

## 2025-03-24 PROCEDURE — 96361 HYDRATE IV INFUSION ADD-ON: CPT

## 2025-03-24 PROCEDURE — 93005 ELECTROCARDIOGRAM TRACING: CPT

## 2025-03-24 RX ORDER — POTASSIUM CHLORIDE 1500 MG/1
40 TABLET, EXTENDED RELEASE ORAL ONCE
Status: COMPLETED | OUTPATIENT
Start: 2025-03-24 | End: 2025-03-24

## 2025-03-24 RX ORDER — 0.9 % SODIUM CHLORIDE 0.9 %
1000 INTRAVENOUS SOLUTION INTRAVENOUS ONCE
Status: COMPLETED | OUTPATIENT
Start: 2025-03-24 | End: 2025-03-24

## 2025-03-24 RX ADMIN — SODIUM CHLORIDE 1000 ML: 0.9 INJECTION, SOLUTION INTRAVENOUS at 19:04

## 2025-03-24 RX ADMIN — CARBAMIDE PEROXIDE 6.5% 5 DROP: 6.5 LIQUID AURICULAR (OTIC) at 18:56

## 2025-03-24 RX ADMIN — POTASSIUM CHLORIDE 40 MEQ: 1500 TABLET, EXTENDED RELEASE ORAL at 21:35

## 2025-03-24 NOTE — ED PROVIDER NOTES
[facility] EMERGENCY DEPARTMENT  EMERGENCY DEPARTMENT ENCOUNTER        Pt Name: Laura Nunez  MRN: [id number]  Birthdate 1945  Date of evaluation: 3/24/2025  Provider: Zay Bates MD  Attending Provider: Gisele Null MD  PCP: Olu Byers DO  Note Started: 6:16 PM EDT 3/24/25    CHIEF COMPLAINT       Chief Complaint   Patient presents with    Dizziness    Loss of Consciousness     Passed out at least  times over the past month. Pt states dizziness with change in positions. -       HISTORY OF PRESENT ILLNESS: 1 or more Elements   History From: The patient        Laura Nunez is a 79 y.o. female with a past medical history of hypertension, bilateral carotid artery stenosis, appendectomy, hysterectomy presenting with dizziness and loss of consciousness.  Patient states that for the last month, she has had multiple episodes where she would just pass out suddenly without warning.  She states that she is also having associated dizziness.  Worse when she stands from a sitting position.  She denies any chest pain or shortness of breath or numbness or weakness or loss of sensation.  She denies any nausea or vomiting or abdominal pain.  She denies any anticoagulant use.    Nursing Notes were all reviewed and agreed with or any disagreements were addressed in the HPI.      REVIEW OF EXTERNAL NOTE :           PDMP Monitoring:    Last PDMP Alberto as Reviewed:  Review User Review Instant Review Result            Urine Drug Screenings (1 yr)    No resulted procedures found.       Medication Contract and Consent for Opioid Use Documents Filed        No documents found                      REVIEW OF SYSTEMS :           Positives and Pertinent negatives as per HPI.     SURGICAL HISTORY     Past Surgical History:   Procedure Laterality Date    APPENDECTOMY      FOOT SURGERY      HERNIA REPAIR  2015    HYSTERECTOMY (CERVIX STATUS UNKNOWN)      NECK SURGERY      excision of neck tumor

## 2025-03-25 ENCOUNTER — APPOINTMENT (OUTPATIENT)
Age: 80
DRG: 312 | End: 2025-03-25
Payer: MEDICARE

## 2025-03-25 LAB
ANION GAP SERPL CALCULATED.3IONS-SCNC: 17 MMOL/L (ref 7–16)
BASOPHILS # BLD: 0.06 K/UL (ref 0–0.2)
BASOPHILS NFR BLD: 1 % (ref 0–2)
BUN SERPL-MCNC: 18 MG/DL (ref 6–23)
CALCIUM SERPL-MCNC: 9.1 MG/DL (ref 8.6–10.2)
CHLORIDE SERPL-SCNC: 105 MMOL/L (ref 98–107)
CO2 SERPL-SCNC: 21 MMOL/L (ref 22–29)
CREAT SERPL-MCNC: 0.7 MG/DL (ref 0.5–1)
ECHO AO ASC DIAM: 2.7 CM
ECHO AV AREA PEAK VELOCITY: 1.7 CM2
ECHO AV AREA VTI: 1.6 CM2
ECHO AV CUSP MM: 1.7 CM
ECHO AV MEAN GRADIENT: 11 MMHG
ECHO AV MEAN VELOCITY: 1.5 M/S
ECHO AV PEAK GRADIENT: 19 MMHG
ECHO AV PEAK VELOCITY: 2.2 M/S
ECHO AV VELOCITY RATIO: 0.5
ECHO AV VTI: 54.7 CM
ECHO EST RA PRESSURE: 8 MMHG
ECHO LA DIAMETER: 2.7 CM
ECHO LA VOL A-L A2C: 90 ML (ref 22–52)
ECHO LA VOL A-L A4C: 87 ML (ref 22–52)
ECHO LA VOL BP: 88 ML (ref 22–52)
ECHO LA VOL MOD A2C: 86 ML (ref 22–52)
ECHO LA VOL MOD A4C: 81 ML (ref 22–52)
ECHO LA VOLUME AREA LENGTH: 94 ML
ECHO LV EF PHYSICIAN: 55 %
ECHO LV FRACTIONAL SHORTENING: 35 % (ref 28–44)
ECHO LV INTERNAL DIMENSION DIASTOLIC: 4.9 CM (ref 3.9–5.3)
ECHO LV INTERNAL DIMENSION SYSTOLIC: 3.2 CM
ECHO LV IVSD: 1 CM (ref 0.6–0.9)
ECHO LV MASS 2D: 153 G (ref 67–162)
ECHO LV POSTERIOR WALL DIASTOLIC: 0.8 CM (ref 0.6–0.9)
ECHO LV RELATIVE WALL THICKNESS RATIO: 0.33
ECHO LVOT AREA: 3.5 CM2
ECHO LVOT AV VTI INDEX: 0.48
ECHO LVOT DIAM: 2.1 CM
ECHO LVOT MEAN GRADIENT: 3 MMHG
ECHO LVOT PEAK GRADIENT: 5 MMHG
ECHO LVOT PEAK VELOCITY: 1.1 M/S
ECHO LVOT SV: 91 ML
ECHO LVOT VTI: 26.3 CM
ECHO MV "A" WAVE DURATION: 139.9 MSEC
ECHO MV A VELOCITY: 1.14 M/S
ECHO MV AREA PHT: 3.3 CM2
ECHO MV AREA VTI: 2.9 CM2
ECHO MV E DECELERATION TIME (DT): 283.8 MS
ECHO MV E VELOCITY: 0.74 M/S
ECHO MV E/A RATIO: 0.65
ECHO MV LVOT VTI INDEX: 1.19
ECHO MV MAX VELOCITY: 1.1 M/S
ECHO MV MEAN GRADIENT: 2 MMHG
ECHO MV MEAN VELOCITY: 0.7 M/S
ECHO MV PEAK GRADIENT: 5 MMHG
ECHO MV PRESSURE HALF TIME (PHT): 67 MS
ECHO MV VTI: 31.3 CM
ECHO PULMONARY ARTERY SYSTOLIC PRESSURE (PASP): 32 MMHG
ECHO PV MAX VELOCITY: 1.1 M/S
ECHO PV MEAN GRADIENT: 2 MMHG
ECHO PV MEAN VELOCITY: 0.7 M/S
ECHO PV PEAK GRADIENT: 5 MMHG
ECHO PV VTI: 18.3 CM
ECHO RIGHT VENTRICULAR SYSTOLIC PRESSURE (RVSP): 32 MMHG
ECHO RV INTERNAL DIMENSION: 2.2 CM
ECHO TV REGURGITANT MAX VELOCITY: 2.47 M/S
ECHO TV REGURGITANT PEAK GRADIENT: 24 MMHG
EOSINOPHIL # BLD: 0.07 K/UL (ref 0.05–0.5)
EOSINOPHILS RELATIVE PERCENT: 1 % (ref 0–6)
ERYTHROCYTE [DISTWIDTH] IN BLOOD BY AUTOMATED COUNT: 13.5 % (ref 11.5–15)
GFR, ESTIMATED: 86 ML/MIN/1.73M2
GLUCOSE BLD-MCNC: 104 MG/DL (ref 74–99)
GLUCOSE SERPL-MCNC: 92 MG/DL (ref 74–99)
HCT VFR BLD AUTO: 41.4 % (ref 34–48)
HGB BLD-MCNC: 13.9 G/DL (ref 11.5–15.5)
IMM GRANULOCYTES # BLD AUTO: <0.03 K/UL (ref 0–0.58)
IMM GRANULOCYTES NFR BLD: 0 % (ref 0–5)
LYMPHOCYTES NFR BLD: 1.59 K/UL (ref 1.5–4)
LYMPHOCYTES RELATIVE PERCENT: 27 % (ref 20–42)
MAGNESIUM SERPL-MCNC: 2.1 MG/DL (ref 1.6–2.6)
MCH RBC QN AUTO: 29.8 PG (ref 26–35)
MCHC RBC AUTO-ENTMCNC: 33.6 G/DL (ref 32–34.5)
MCV RBC AUTO: 88.8 FL (ref 80–99.9)
MONOCYTES NFR BLD: 0.66 K/UL (ref 0.1–0.95)
MONOCYTES NFR BLD: 11 % (ref 2–12)
NEUTROPHILS NFR BLD: 60 % (ref 43–80)
NEUTS SEG NFR BLD: 3.55 K/UL (ref 1.8–7.3)
PLATELET # BLD AUTO: 234 K/UL (ref 130–450)
PMV BLD AUTO: 9.5 FL (ref 7–12)
POTASSIUM SERPL-SCNC: 3.4 MMOL/L (ref 3.5–5)
RBC # BLD AUTO: 4.66 M/UL (ref 3.5–5.5)
SODIUM SERPL-SCNC: 143 MMOL/L (ref 132–146)
WBC OTHER # BLD: 6 K/UL (ref 4.5–11.5)

## 2025-03-25 PROCEDURE — 82962 GLUCOSE BLOOD TEST: CPT

## 2025-03-25 PROCEDURE — 96376 TX/PRO/DX INJ SAME DRUG ADON: CPT

## 2025-03-25 PROCEDURE — 80048 BASIC METABOLIC PNL TOTAL CA: CPT

## 2025-03-25 PROCEDURE — G0378 HOSPITAL OBSERVATION PER HR: HCPCS

## 2025-03-25 PROCEDURE — 96374 THER/PROPH/DIAG INJ IV PUSH: CPT

## 2025-03-25 PROCEDURE — 96361 HYDRATE IV INFUSION ADD-ON: CPT

## 2025-03-25 PROCEDURE — 85025 COMPLETE CBC W/AUTO DIFF WBC: CPT

## 2025-03-25 PROCEDURE — 83735 ASSAY OF MAGNESIUM: CPT

## 2025-03-25 PROCEDURE — 6370000000 HC RX 637 (ALT 250 FOR IP): Performed by: NURSE PRACTITIONER

## 2025-03-25 PROCEDURE — 2580000003 HC RX 258: Performed by: NURSE PRACTITIONER

## 2025-03-25 PROCEDURE — 93306 TTE W/DOPPLER COMPLETE: CPT

## 2025-03-25 PROCEDURE — 6360000002 HC RX W HCPCS: Performed by: NURSE PRACTITIONER

## 2025-03-25 PROCEDURE — 2500000003 HC RX 250 WO HCPCS: Performed by: NURSE PRACTITIONER

## 2025-03-25 PROCEDURE — 6370000000 HC RX 637 (ALT 250 FOR IP): Performed by: INTERNAL MEDICINE

## 2025-03-25 PROCEDURE — 93306 TTE W/DOPPLER COMPLETE: CPT | Performed by: INTERNAL MEDICINE

## 2025-03-25 PROCEDURE — 36415 COLL VENOUS BLD VENIPUNCTURE: CPT

## 2025-03-25 PROCEDURE — 96372 THER/PROPH/DIAG INJ SC/IM: CPT

## 2025-03-25 RX ORDER — MULTIVITAMIN WITH IRON
1 TABLET ORAL DAILY
Status: DISCONTINUED | OUTPATIENT
Start: 2025-03-25 | End: 2025-03-28 | Stop reason: HOSPADM

## 2025-03-25 RX ORDER — MAGNESIUM SULFATE IN WATER 40 MG/ML
2000 INJECTION, SOLUTION INTRAVENOUS PRN
Status: DISCONTINUED | OUTPATIENT
Start: 2025-03-25 | End: 2025-03-28 | Stop reason: HOSPADM

## 2025-03-25 RX ORDER — ASPIRIN 81 MG/1
81 TABLET ORAL EVERY MORNING
Status: DISCONTINUED | OUTPATIENT
Start: 2025-03-25 | End: 2025-03-28 | Stop reason: HOSPADM

## 2025-03-25 RX ORDER — MULTIVITAMIN WITH IRON
100 TABLET ORAL DAILY
COMMUNITY

## 2025-03-25 RX ORDER — ACETAMINOPHEN 650 MG/1
650 SUPPOSITORY RECTAL EVERY 6 HOURS PRN
Status: DISCONTINUED | OUTPATIENT
Start: 2025-03-25 | End: 2025-03-28 | Stop reason: HOSPADM

## 2025-03-25 RX ORDER — CARVEDILOL 6.25 MG/1
12.5 TABLET ORAL 2 TIMES DAILY
Status: DISCONTINUED | OUTPATIENT
Start: 2025-03-25 | End: 2025-03-28 | Stop reason: HOSPADM

## 2025-03-25 RX ORDER — BUSPIRONE HYDROCHLORIDE 5 MG/1
7.5 TABLET ORAL 2 TIMES DAILY
Status: DISCONTINUED | OUTPATIENT
Start: 2025-03-25 | End: 2025-03-25

## 2025-03-25 RX ORDER — POTASSIUM CHLORIDE 1500 MG/1
40 TABLET, EXTENDED RELEASE ORAL DAILY
Status: DISCONTINUED | OUTPATIENT
Start: 2025-03-25 | End: 2025-03-28 | Stop reason: HOSPADM

## 2025-03-25 RX ORDER — SODIUM CHLORIDE 0.9 % (FLUSH) 0.9 %
5-40 SYRINGE (ML) INJECTION PRN
Status: DISCONTINUED | OUTPATIENT
Start: 2025-03-25 | End: 2025-03-28 | Stop reason: HOSPADM

## 2025-03-25 RX ORDER — ACETAMINOPHEN 325 MG/1
650 TABLET ORAL EVERY 6 HOURS PRN
Status: DISCONTINUED | OUTPATIENT
Start: 2025-03-25 | End: 2025-03-28 | Stop reason: HOSPADM

## 2025-03-25 RX ORDER — HYDRALAZINE HYDROCHLORIDE 20 MG/ML
10 INJECTION INTRAMUSCULAR; INTRAVENOUS EVERY 6 HOURS PRN
Status: DISCONTINUED | OUTPATIENT
Start: 2025-03-25 | End: 2025-03-28

## 2025-03-25 RX ORDER — VALSARTAN 80 MG/1
160 TABLET ORAL DAILY
Status: DISCONTINUED | OUTPATIENT
Start: 2025-03-25 | End: 2025-03-25

## 2025-03-25 RX ORDER — SODIUM CHLORIDE 9 MG/ML
INJECTION, SOLUTION INTRAVENOUS CONTINUOUS
Status: DISCONTINUED | OUTPATIENT
Start: 2025-03-25 | End: 2025-03-28 | Stop reason: HOSPADM

## 2025-03-25 RX ORDER — ONDANSETRON 2 MG/ML
4 INJECTION INTRAMUSCULAR; INTRAVENOUS EVERY 6 HOURS PRN
Status: DISCONTINUED | OUTPATIENT
Start: 2025-03-25 | End: 2025-03-25

## 2025-03-25 RX ORDER — PANTOPRAZOLE SODIUM 40 MG/1
40 TABLET, DELAYED RELEASE ORAL
Status: DISCONTINUED | OUTPATIENT
Start: 2025-03-25 | End: 2025-03-25

## 2025-03-25 RX ORDER — VALSARTAN 320 MG/1
320 TABLET ORAL DAILY
Status: DISCONTINUED | OUTPATIENT
Start: 2025-03-25 | End: 2025-03-28 | Stop reason: HOSPADM

## 2025-03-25 RX ORDER — PROCHLORPERAZINE EDISYLATE 5 MG/ML
5 INJECTION INTRAMUSCULAR; INTRAVENOUS EVERY 6 HOURS PRN
Status: DISCONTINUED | OUTPATIENT
Start: 2025-03-25 | End: 2025-03-28 | Stop reason: HOSPADM

## 2025-03-25 RX ORDER — VALSARTAN AND HYDROCHLOROTHIAZIDE 320; 25 MG/1; MG/1
1 TABLET, FILM COATED ORAL DAILY
Status: ON HOLD | COMMUNITY
Start: 2025-02-26 | End: 2025-03-28 | Stop reason: HOSPADM

## 2025-03-25 RX ORDER — VITAMIN B COMPLEX
100 TABLET ORAL EVERY MORNING
Status: DISCONTINUED | OUTPATIENT
Start: 2025-03-25 | End: 2025-03-25 | Stop reason: CLARIF

## 2025-03-25 RX ORDER — ENOXAPARIN SODIUM 100 MG/ML
40 INJECTION SUBCUTANEOUS DAILY
Status: DISCONTINUED | OUTPATIENT
Start: 2025-03-25 | End: 2025-03-28 | Stop reason: HOSPADM

## 2025-03-25 RX ORDER — AMLODIPINE BESYLATE 5 MG/1
5 TABLET ORAL DAILY
Status: DISCONTINUED | OUTPATIENT
Start: 2025-03-25 | End: 2025-03-28 | Stop reason: HOSPADM

## 2025-03-25 RX ORDER — POLYETHYLENE GLYCOL 3350 17 G/17G
17 POWDER, FOR SOLUTION ORAL DAILY PRN
Status: DISCONTINUED | OUTPATIENT
Start: 2025-03-25 | End: 2025-03-28 | Stop reason: HOSPADM

## 2025-03-25 RX ORDER — ROSUVASTATIN CALCIUM 20 MG/1
20 TABLET, COATED ORAL NIGHTLY
Status: DISCONTINUED | OUTPATIENT
Start: 2025-03-25 | End: 2025-03-28 | Stop reason: HOSPADM

## 2025-03-25 RX ORDER — ONDANSETRON 4 MG/1
4 TABLET, ORALLY DISINTEGRATING ORAL EVERY 8 HOURS PRN
Status: DISCONTINUED | OUTPATIENT
Start: 2025-03-25 | End: 2025-03-25

## 2025-03-25 RX ORDER — CITALOPRAM HYDROBROMIDE 10 MG/1
10 TABLET ORAL NIGHTLY
Status: DISCONTINUED | OUTPATIENT
Start: 2025-03-25 | End: 2025-03-25

## 2025-03-25 RX ORDER — SODIUM CHLORIDE 9 MG/ML
INJECTION, SOLUTION INTRAVENOUS PRN
Status: DISCONTINUED | OUTPATIENT
Start: 2025-03-25 | End: 2025-03-28 | Stop reason: HOSPADM

## 2025-03-25 RX ORDER — PANTOPRAZOLE SODIUM 40 MG/1
40 TABLET, DELAYED RELEASE ORAL DAILY
COMMUNITY
Start: 2025-02-26

## 2025-03-25 RX ORDER — HYDRALAZINE HYDROCHLORIDE 20 MG/ML
10 INJECTION INTRAMUSCULAR; INTRAVENOUS ONCE
Status: COMPLETED | OUTPATIENT
Start: 2025-03-25 | End: 2025-03-25

## 2025-03-25 RX ORDER — POTASSIUM CHLORIDE 1500 MG/1
40 TABLET, EXTENDED RELEASE ORAL PRN
Status: ACTIVE | OUTPATIENT
Start: 2025-03-25 | End: 2025-03-27

## 2025-03-25 RX ORDER — POTASSIUM CHLORIDE 7.45 MG/ML
10 INJECTION INTRAVENOUS PRN
Status: ACTIVE | OUTPATIENT
Start: 2025-03-25 | End: 2025-03-27

## 2025-03-25 RX ORDER — SODIUM CHLORIDE 0.9 % (FLUSH) 0.9 %
5-40 SYRINGE (ML) INJECTION EVERY 12 HOURS SCHEDULED
Status: DISCONTINUED | OUTPATIENT
Start: 2025-03-25 | End: 2025-03-28 | Stop reason: HOSPADM

## 2025-03-25 RX ADMIN — CARVEDILOL 12.5 MG: 6.25 TABLET, FILM COATED ORAL at 08:18

## 2025-03-25 RX ADMIN — VALSARTAN 320 MG: 80 TABLET, FILM COATED ORAL at 08:18

## 2025-03-25 RX ADMIN — AMLODIPINE BESYLATE 5 MG: 5 TABLET ORAL at 17:56

## 2025-03-25 RX ADMIN — HYDRALAZINE HYDROCHLORIDE 10 MG: 20 INJECTION INTRAMUSCULAR; INTRAVENOUS at 01:01

## 2025-03-25 RX ADMIN — SODIUM CHLORIDE: 0.9 INJECTION, SOLUTION INTRAVENOUS at 00:41

## 2025-03-25 RX ADMIN — HYDRALAZINE HYDROCHLORIDE 10 MG: 20 INJECTION INTRAMUSCULAR; INTRAVENOUS at 02:21

## 2025-03-25 RX ADMIN — SODIUM CHLORIDE, PRESERVATIVE FREE 10 ML: 5 INJECTION INTRAVENOUS at 19:55

## 2025-03-25 RX ADMIN — SODIUM CHLORIDE, PRESERVATIVE FREE 10 ML: 5 INJECTION INTRAVENOUS at 08:19

## 2025-03-25 RX ADMIN — MULTIVITAMIN TABLET 1 TABLET: TABLET at 08:18

## 2025-03-25 RX ADMIN — POTASSIUM CHLORIDE 40 MEQ: 1500 TABLET, EXTENDED RELEASE ORAL at 08:18

## 2025-03-25 RX ADMIN — CARVEDILOL 12.5 MG: 6.25 TABLET, FILM COATED ORAL at 19:55

## 2025-03-25 RX ADMIN — ROSUVASTATIN CALCIUM 20 MG: 20 TABLET, FILM COATED ORAL at 19:55

## 2025-03-25 RX ADMIN — ASPIRIN 81 MG: 81 TABLET, COATED ORAL at 08:18

## 2025-03-25 RX ADMIN — HYDRALAZINE HYDROCHLORIDE 10 MG: 20 INJECTION INTRAMUSCULAR; INTRAVENOUS at 12:32

## 2025-03-25 RX ADMIN — ENOXAPARIN SODIUM 40 MG: 100 INJECTION SUBCUTANEOUS at 08:19

## 2025-03-25 ASSESSMENT — ENCOUNTER SYMPTOMS
RESPIRATORY NEGATIVE: 1
VOMITING: 0
DIARRHEA: 0
ABDOMINAL PAIN: 0
NAUSEA: 0
EYES NEGATIVE: 1

## 2025-03-25 ASSESSMENT — LIFESTYLE VARIABLES
HOW MANY STANDARD DRINKS CONTAINING ALCOHOL DO YOU HAVE ON A TYPICAL DAY: 1 OR 2
HOW OFTEN DO YOU HAVE A DRINK CONTAINING ALCOHOL: 2-4 TIMES A MONTH

## 2025-03-25 ASSESSMENT — PAIN SCALES - GENERAL
PAINLEVEL_OUTOF10: 0

## 2025-03-25 NOTE — PROGRESS NOTES
Pharmacy Note    Laurasa Nunez was ordered CoQ 10 100mg capsules.  As per the [facility] Formulary Committee Policy, herbals and certain dietary supplements will be discontinued.  The herbal or dietary supplement may be continued after discharge from the hospital.    Omayra Ortega, PharmD, [facility], BCPS 3/25/2025 12:24 AM

## 2025-03-25 NOTE — PROGRESS NOTES
Orthostatic BP completed.  BP's all extremely high.  Perfect Serve sent to April Deandra Grady NP.  Updated on elevated BP.

## 2025-03-25 NOTE — FLOWSHEET NOTE
03/25/25 1449   Vital Signs   Orthostatic B/P and Pulse? Yes   Blood Pressure Lying 140/69   Pulse Lying 90 PER MINUTE   Blood Pressure Sitting 139/79   Pulse Sitting 75 PER MINUTE   Blood Pressure Standing 129/76   Pulse Standing 80 PER MINUTE

## 2025-03-25 NOTE — PROGRESS NOTES
Nurse to nurse report called to JOHN Mcdaniels on 8S for patient transfer to [facility]. Patient made aware of transfer. Patient updates son.

## 2025-03-25 NOTE — H&P
[facility]   History and Physical      CHIEF COMPLAINT:  syncope    Reason for Admission:  syncope    History Obtained From:  patient, electronic medical record    HISTORY OF PRESENT ILLNESS:      The patient is a 79 y.o. female of Olu Byers DO with significant past medical history of HTN and carotid stenosis who presents with syncope.    For last 3-4 weeks, has had several episodes of syncope and pre-syncope.  4 episodes of LOC in last 3-4 weeks.  No association with using restroom, standing up, or coughing.  No chest pain.  No prodrome. Was sitting and sudden found herself with her head on the table.   Some light-headedness intermittent; worse with standing.  No N/V, diarrhea, or decreased PO.    Normally has slightly elevated -140s.  In ER, -200s.  Given hydralazine 10 mg IV and Kcl 40.    Feeling little better. SBP 150s.  No further episodes of syncope since being in ER.  No other concerns.     All labs personally reviewed   All imaging personally reviewed     Past Medical History:        Diagnosis Date    Carotid stenosis, bilateral 5/2/2012    Hypertension     Left carotid stenosis 8/26/2015     Past Surgical History:        Procedure Laterality Date    APPENDECTOMY      FOOT SURGERY      HERNIA REPAIR  2015    HYSTERECTOMY (CERVIX STATUS UNKNOWN)      NECK SURGERY      excision of neck tumor    SHOULDER SURGERY Right 12/2015    TONSILLECTOMY           Medications Prior to Admission:    Medications Prior to Admission: pantoprazole (PROTONIX) 40 MG tablet, Take 1 tablet by mouth daily  valsartan-hydroCHLOROthiazide (DIOVAN-HCT) 320-25 MG per tablet, Take 1 tablet by mouth daily  potassium chloride (KLOR-CON M) 20 MEQ extended release tablet, Take 2 tablets by mouth daily  citalopram (CELEXA) 10 MG tablet, Take 1 tablet by mouth nightly  rosuvastatin (CRESTOR) 20 MG tablet, Take 1 tablet by mouth nightly  aspirin 81 MG EC tablet, Take 1 tablet by mouth every morning  Coenzyme       Mouth/Throat:      Mouth: Mucous membranes are moist.   Eyes:      Extraocular Movements: Extraocular movements intact.      Conjunctiva/sclera: Conjunctivae normal.      Pupils: Pupils are equal, round, and reactive to light.   Cardiovascular:      Rate and Rhythm: Normal rate and regular rhythm.      Pulses: Normal pulses.      Heart sounds: No murmur heard.  Pulmonary:      Effort: Pulmonary effort is normal. No respiratory distress.      Breath sounds: Normal breath sounds. No wheezing or rales.   Abdominal:      General: Abdomen is flat. Bowel sounds are normal. There is no distension.      Palpations: Abdomen is soft.      Tenderness: There is no abdominal tenderness. There is no guarding.   Musculoskeletal:      Cervical back: Normal range of motion and neck supple.      Right lower leg: No edema.      Left lower leg: No edema.   Skin:     General: Skin is warm.      Capillary Refill: Capillary refill takes less than 2 seconds.      Findings: No rash.   Neurological:      General: No focal deficit present.      Mental Status: She is alert and oriented to person, place, and time. Mental status is at baseline.      Cranial Nerves: No cranial nerve deficit.   Psychiatric:         Mood and Affect: Mood normal.         Behavior: Behavior normal.         Thought Content: Thought content normal.        Breast: deferred  Rectal: deferred  Genitalia:  deferred      DATA:     Recent Labs     03/24/25  1842 03/25/25  0820   WBC 6.4 6.0   HGB 13.4 13.9    234     Recent Labs     03/24/25  1842 03/25/25  0820    143   K 3.3* 3.4*   BUN 26* 18   CREATININE 0.8 0.7     No results for input(s): \"INR\" in the last 72 hours.    Invalid input(s): \"PROT\"  Recent Labs     03/24/25  1842   AST 19   ALT 13   ALKPHOS 104   BILITOT 1.2     No results for input(s): \"BNP\" in the last 72 hours.  No results for input(s): \"CKTOTAL\", \"CKMB\", \"CKMBINDEX\", \"TROPONINI\" in the last 72 hours.    ASSESSMENT:      Principal

## 2025-03-25 NOTE — PROGRESS NOTES
Medication list reviewed with patient including the outside pharmacy orders. List has been updated.

## 2025-03-25 NOTE — PROGRESS NOTES
4 Eyes Skin Assessment     NAME:  Laura Nunez  YOB: 1945  MEDICAL RECORD NUMBER:  [id number]    The patient is being assessed for  Admission    I agree that at least one RN has performed a thorough Head to Toe Skin Assessment on the patient. ALL assessment sites listed below have been assessed.      Areas assessed by both nurses:    Head, Face, Ears, Shoulders, Back, Chest, Arms, Elbows, Hands, Sacrum. Buttock, Coccyx, Ischium, Legs. Feet and Heels, and Under Medical Devices         Does the Patient have a Wound? No noted wound(s)       David Prevention initiated by RN: No  Wound Care Orders initiated by RN: No    Pressure Injury (Stage 3,4, Unstageable, DTI, NWPT, and Complex wounds) if present, place Wound referral order by RN under : No    New Ostomies, if present place, Ostomy referral order under : No     Nurse 1 eSignature: Electronically signed by SRAVANTHI DELGADILLO RN on 3/25/25 at 7:04 PM EDT    **SHARE this note so that the co-signing nurse can place an eSignature**    Nurse 2 eSignature: Electronically signed by Masha Sequeira RN on 3/25/25 at 7:05 PM EDT

## 2025-03-26 LAB
ANION GAP SERPL CALCULATED.3IONS-SCNC: 12 MMOL/L (ref 7–16)
BASOPHILS # BLD: 0.05 K/UL (ref 0–0.2)
BASOPHILS NFR BLD: 1 % (ref 0–2)
BUN SERPL-MCNC: 25 MG/DL (ref 6–23)
CALCIUM SERPL-MCNC: 8.8 MG/DL (ref 8.6–10.2)
CHLORIDE SERPL-SCNC: 107 MMOL/L (ref 98–107)
CO2 SERPL-SCNC: 21 MMOL/L (ref 22–29)
CREAT SERPL-MCNC: 0.8 MG/DL (ref 0.5–1)
CRP SERPL HS-MCNC: 4 MG/L (ref 0–5)
EOSINOPHIL # BLD: 0.07 K/UL (ref 0.05–0.5)
EOSINOPHILS RELATIVE PERCENT: 1 % (ref 0–6)
ERYTHROCYTE [DISTWIDTH] IN BLOOD BY AUTOMATED COUNT: 13.8 % (ref 11.5–15)
ERYTHROCYTE [SEDIMENTATION RATE] IN BLOOD BY WESTERGREN METHOD: 12 MM/HR (ref 0–20)
GFR, ESTIMATED: 72 ML/MIN/1.73M2
GLUCOSE SERPL-MCNC: 94 MG/DL (ref 74–99)
HCT VFR BLD AUTO: 37.2 % (ref 34–48)
HGB BLD-MCNC: 12.3 G/DL (ref 11.5–15.5)
IMM GRANULOCYTES # BLD AUTO: <0.03 K/UL (ref 0–0.58)
IMM GRANULOCYTES NFR BLD: 0 % (ref 0–5)
LYMPHOCYTES NFR BLD: 1.63 K/UL (ref 1.5–4)
LYMPHOCYTES RELATIVE PERCENT: 28 % (ref 20–42)
MCH RBC QN AUTO: 29.4 PG (ref 26–35)
MCHC RBC AUTO-ENTMCNC: 33.1 G/DL (ref 32–34.5)
MCV RBC AUTO: 89 FL (ref 80–99.9)
MONOCYTES NFR BLD: 0.66 K/UL (ref 0.1–0.95)
MONOCYTES NFR BLD: 11 % (ref 2–12)
NEUTROPHILS NFR BLD: 59 % (ref 43–80)
NEUTS SEG NFR BLD: 3.49 K/UL (ref 1.8–7.3)
PLATELET # BLD AUTO: 218 K/UL (ref 130–450)
PMV BLD AUTO: 9.6 FL (ref 7–12)
POTASSIUM SERPL-SCNC: 3.6 MMOL/L (ref 3.5–5)
RBC # BLD AUTO: 4.18 M/UL (ref 3.5–5.5)
SODIUM SERPL-SCNC: 140 MMOL/L (ref 132–146)
TSH SERPL DL<=0.05 MIU/L-ACNC: 3.48 UIU/ML (ref 0.27–4.2)
WBC OTHER # BLD: 5.9 K/UL (ref 4.5–11.5)

## 2025-03-26 PROCEDURE — 86140 C-REACTIVE PROTEIN: CPT

## 2025-03-26 PROCEDURE — 2580000003 HC RX 258

## 2025-03-26 PROCEDURE — 80048 BASIC METABOLIC PNL TOTAL CA: CPT

## 2025-03-26 PROCEDURE — 96361 HYDRATE IV INFUSION ADD-ON: CPT

## 2025-03-26 PROCEDURE — 6370000000 HC RX 637 (ALT 250 FOR IP): Performed by: NURSE PRACTITIONER

## 2025-03-26 PROCEDURE — 6370000000 HC RX 637 (ALT 250 FOR IP): Performed by: INTERNAL MEDICINE

## 2025-03-26 PROCEDURE — APPSS60 APP SPLIT SHARED TIME 46-60 MINUTES

## 2025-03-26 PROCEDURE — 85652 RBC SED RATE AUTOMATED: CPT

## 2025-03-26 PROCEDURE — 1200000000 HC SEMI PRIVATE

## 2025-03-26 PROCEDURE — 2500000003 HC RX 250 WO HCPCS: Performed by: NURSE PRACTITIONER

## 2025-03-26 PROCEDURE — 84443 ASSAY THYROID STIM HORMONE: CPT

## 2025-03-26 PROCEDURE — 85025 COMPLETE CBC W/AUTO DIFF WBC: CPT

## 2025-03-26 PROCEDURE — 99222 1ST HOSP IP/OBS MODERATE 55: CPT | Performed by: INTERNAL MEDICINE

## 2025-03-26 PROCEDURE — 36415 COLL VENOUS BLD VENIPUNCTURE: CPT

## 2025-03-26 RX ORDER — SODIUM CHLORIDE 9 MG/ML
INJECTION, SOLUTION INTRAVENOUS CONTINUOUS
Status: DISCONTINUED | OUTPATIENT
Start: 2025-03-26 | End: 2025-03-28

## 2025-03-26 RX ADMIN — POTASSIUM CHLORIDE 40 MEQ: 1500 TABLET, EXTENDED RELEASE ORAL at 08:38

## 2025-03-26 RX ADMIN — ASPIRIN 81 MG: 81 TABLET, COATED ORAL at 08:38

## 2025-03-26 RX ADMIN — AMLODIPINE BESYLATE 5 MG: 5 TABLET ORAL at 08:39

## 2025-03-26 RX ADMIN — SODIUM CHLORIDE: 0.9 INJECTION, SOLUTION INTRAVENOUS at 13:30

## 2025-03-26 RX ADMIN — CARVEDILOL 12.5 MG: 6.25 TABLET, FILM COATED ORAL at 08:39

## 2025-03-26 RX ADMIN — MULTIVITAMIN TABLET 1 TABLET: TABLET at 08:39

## 2025-03-26 RX ADMIN — SODIUM CHLORIDE, PRESERVATIVE FREE 10 ML: 5 INJECTION INTRAVENOUS at 20:56

## 2025-03-26 RX ADMIN — ROSUVASTATIN CALCIUM 20 MG: 20 TABLET, FILM COATED ORAL at 20:56

## 2025-03-26 RX ADMIN — VALSARTAN 320 MG: 80 TABLET, FILM COATED ORAL at 08:39

## 2025-03-26 RX ADMIN — CARVEDILOL 12.5 MG: 6.25 TABLET, FILM COATED ORAL at 20:56

## 2025-03-26 RX ADMIN — SODIUM CHLORIDE, PRESERVATIVE FREE 10 ML: 5 INJECTION INTRAVENOUS at 08:39

## 2025-03-26 ASSESSMENT — PAIN SCALES - GENERAL
PAINLEVEL_OUTOF10: 0

## 2025-03-26 NOTE — PROGRESS NOTES
Patient received the Sacrament of the Anointing of the Sick by Father Carl Anne on Tuesday, March 25, 2025.    If additional support is requested or needed please reach out to Spiritual Health ([id number]).    Chap. Aaron Alejandra MDIV, BCC

## 2025-03-26 NOTE — FLOWSHEET NOTE
03/26/25 0840   Vital Signs   Orthostatic B/P and Pulse? Yes   Blood Pressure Lying 169/93   Pulse Lying 66 PER MINUTE   Blood Pressure Sitting 158/88   Pulse Sitting 74 PER MINUTE   Blood Pressure Standing 134/88   Pulse Standing 83 PER MINUTE

## 2025-03-26 NOTE — PROGRESS NOTES
Cardiothoracic consult sent via perfect serve          Electronically signed by SRAVANTHI DELGADILLO RN on 3/26/2025 at 1:09 PM

## 2025-03-26 NOTE — CONSULTS
Inpatient Cardiology Consultation      Reason for Consult:  Syncope, mod pericardial effusion, ? tamponade physiology    Consulting Physician: Dr. Green    Requesting Physician:  Tamiko Carrizales, JEOVANY - CNP     Date of Consultation: 3/26/2025    HISTORY OF PRESENT ILLNESS:   Patient is a 79-year-old female who was seen 1 time inpatient by [facility] cardiology Dr. Reyes 2/2018 for chest pain and shortness of breath.  More recently patient has follow-up with [facility] cardiology 4/2024 with no changes made at that time.    HPI:  Patient presented to ER 3/24/2025 with dizziness and syncopal episode.  Patient reported for the last month she has had multiple episodes of sudden syncope without warning.  She also reports associated dizziness worse when going from sitting to standing.  Patient was hypokalemic 3.3 and repleted.  Patient was significantly hypertensive and given IV labetalol, highest 240/113.  Initial orthostatic vitals x 3 or borderline positive with drop in systolic blood pressure with minimal change in heart rate.  Patient was given 1 L NaCl in ER.  Most recent orthostatic blood pressure today was positive with significant systolic drop and increase of >15 bpm.  Norvasc added to hypertensive regimen per primary service.  Echo was ordered by primary service which showed preserved EF and a moderate 1-2 circumferential pericardial effusion with some RA inversion with suggestion of tamponade physiology, thus cardiology was consulted.  VS upon arrival 98 Fahrenheit, 19 respirations, 88 pulse, 154/101, 95% room air.  Labs: Potassium 3.3 > 3.6, CO2 21, BUN 25, creatinine 0.8, GFR 72, magnesium 2.1, glucose 94, calcium 8.8, troponin <6, BUN 4.0, WBC 5.9, H&H 12.3/37.2, platelet 218  CT head: No acute process  CXR: No acute process  TTE 3/25/2025: EF 55 to 60%.  Normal wall thickness.  NWMA.  Indeterminate diastolic function.  Normal RV size and function.  Mild aortic stenosis mean gradient 11 mmHg.  Mild to moderate  blood pressure and no tachycardia  Hypertension: Moderately elevated with times of significant elevated blood pressure.  Mild aortic stenosis, mild to moderate TR on TTE this admission.  Hyperlipidemia, on Crestor  Recurrent hypokalemia, daily supplementation  Carotid stenosis per chart history> ultrasound 3/2023: 0-50% bilaterally  Thoracic aortic dilation  Syncope 2022 in setting of hyponatremia  Hiatal hernia/GERD/esophagitis: EGD 7/2024 grade B reflux esophagitis with no bleeding.  3 cm hiatal hernia.  Paraesophageal hernia s/p laparoscopic repair 2015 with recurrence and robotic repair 2016  Former tobacco abuse, quit 2007    Plan:  Consult CTS for evaluation of questionable need for therapeutic/diagnostic pericardial window.  Check ESR/sed rate.  Hydrate patient with NaCl 75 mL an hour.  Continue current medications and avoid diuretics or nitrates  Continue telemetry monitoring.  Monitor electrolytes: K potassium >4.0 magnesium greater than 2.0.  Resp primary service on consult.  Case discussed with Dr. Green.  Further recommendations to follow.      Electronically signed by JEOVANY Mauricio CNP on 3/26/2025 at 1:00 PM     Patient's chart reviewed with JEOVANY Mauricio CNP.  Her current hospitalization, past medical history, medications, EKG, laboratory data, imaging including echocardiogram done yesterday were all reviewed.  Patient seen and examined independently.  She has history of idiopathic  pericardial effusion since 2018 and [facility]  She was admitted due to syncope preceded by diaphoresis and flushing.  Her blood pressure has been elevated and today she was orthostatic.  Her physical examination is pertinent for obesity but no acute distress.  She has 1/6 systolic murmur best heard at the second costal space.  Her lungs were clear.  Her abdomen was tender with no abdominal bruit.  She has no lower extremity edema.  Her EKG revealed sinus rhythm at 67 bpm with

## 2025-03-26 NOTE — PROGRESS NOTES
Cardiology consult sent via perfect serve        Electronically signed by SRAVANTHI DELGADILLO RN on 3/26/2025 at 10:44 AM

## 2025-03-26 NOTE — CARE COORDINATION
03/26/2025 CM EVAL: Pt came to ED 03/24/25 after experiencing fall from seated position while @ home.  Pt states she does not remember falling just woke up on floor.  Son brought her to ED. DX: syncope.  Pt experienced orthostatic B/P yesterday-today remains stable. Pt lives alone in 1 story home-alone. 4 step entrance w/ hand rail to right.  Pt denies any hx of DME, services or SNF placement.  No needs identified for home DC plan. PCP Dr. Byers [facility] is [facility] in [place]. Son to transport home @ DC. Jennifer Adler RN  [phone]    Case Management Assessment  Initial Evaluation    Date/Time of Evaluation: 3/26/2025 8:39 AM  Assessment Completed by: Jennifer Adler RN    If patient is discharged prior to next notation, then this note serves as note for discharge by case management.    Patient Name: Laura Nunez                   YOB: 1945  Diagnosis: Hypokalemia [E87.6]  Dizziness [R42]  Syncope, unspecified syncope type [R55]                   Date / Time: 3/24/2025  5:24 PM    Patient Admission Status: Observation   Readmission Risk (Low < 19, Mod (19-27), High > 27): No data recorded  Current PCP: Olu Byers, DO  PCP verified by CM?      Chart Reviewed: Yes      History Provided by:    Patient Orientation:      Patient Cognition:      Hospitalization in the last 30 days (Readmission):  No    If yes, Readmission Assessment in CM Navigator will be completed.    Advance Directives:      Code Status: Full Code   Patient's Primary Decision Maker is: Patient Declined (Legal Next of Kin Remains as Decision Maker)      Discharge Planning:    Patient lives with: Alone Type of Home: House  Primary Care Giver:    Patient Support Systems include: Family Members   Current Financial resources:    Current community resources:    Current services prior to admission: None            Current DME:              Type of Home Care services:  None    ADLS  Prior functional level:    Current functional level:

## 2025-03-26 NOTE — PROGRESS NOTES
[facility]                                Progress note    Subjective:    Resting comfortably in bed  No acute issues or concerns overnight    Objective:    BP (!) 156/88   Pulse 68   Temp 97.6 °F (36.4 °C) (Temporal)   Resp 18   Ht 1.651 m (5' 5\")   Wt 81.2 kg (179 lb)   SpO2 97%   BMI 29.79 kg/m²     In: 1104.4 [P.O.:980; I.V.:124.4]  Out: 300   In: 1104.4   Out: 300 [Urine:300]    General appearance: NAD, conversant  HEENT: AT/NC, MMM  Neck: FROM, supple  Lungs: Clear to auscultation  CV: RRR, no MRGs  Vasc: Radial pulses 2+  Abdomen: Soft, non-tender; no masses or HSM  Extremities: No peripheral edema or digital cyanosis  Skin: no rash, lesions or ulcers  Psych: Alert and oriented to person, place and time  Neuro: Alert and interactive     Recent Labs     03/24/25  1842 03/25/25  0820 03/26/25  0527   WBC 6.4 6.0 5.9   HGB 13.4 13.9 12.3   HCT 40.2 41.4 37.2    234 218       Recent Labs     03/24/25  1842 03/25/25  0820 03/26/25  0527    143 140   K 3.3* 3.4* 3.6    105 107   CO2 22 21* 21*   BUN 26* 18 25*   CREATININE 0.8 0.7 0.8   CALCIUM 8.8 9.1 8.8       Assessment:    Principal Problem:    Syncope, unspecified syncope type  Active Problems:    HTN (hypertension)  Resolved Problems:    * No resolved hospital problems. *      Plan:  Patient is a 79-year-old female admitted to [facility] for  Syncope - recurrent episodes of syncope.  Does not appear to be vasovagal. No common trend.  Labs and exam normal. EKG NSR.  Head CT neg.  Possible elevated BP could trigger carotid sinus and lower HR, to cause syncope.  In ER, one HR 60s with elevated BP.  Otherwise HR stable. No further episodes in hospital.  May also consider lacunar infarct causing symptoms but neuro exam benign and be rare to cause specific symptoms.     Monitor overnight on telemetry.  Treat BP.  Orthostatics in AM. If no further symptoms and BP stable, can d/c tomorrow with outpatient follow up.  If

## 2025-03-27 LAB
ANION GAP SERPL CALCULATED.3IONS-SCNC: 13 MMOL/L (ref 7–16)
BASOPHILS # BLD: 0.05 K/UL (ref 0–0.2)
BASOPHILS NFR BLD: 1 % (ref 0–2)
BUN SERPL-MCNC: 18 MG/DL (ref 6–23)
CALCIUM SERPL-MCNC: 8.7 MG/DL (ref 8.6–10.2)
CHLORIDE SERPL-SCNC: 107 MMOL/L (ref 98–107)
CO2 SERPL-SCNC: 20 MMOL/L (ref 22–29)
CREAT SERPL-MCNC: 0.7 MG/DL (ref 0.5–1)
EOSINOPHIL # BLD: 0.08 K/UL (ref 0.05–0.5)
EOSINOPHILS RELATIVE PERCENT: 1 % (ref 0–6)
ERYTHROCYTE [DISTWIDTH] IN BLOOD BY AUTOMATED COUNT: 13.7 % (ref 11.5–15)
GFR, ESTIMATED: 88 ML/MIN/1.73M2
GLUCOSE SERPL-MCNC: 91 MG/DL (ref 74–99)
HCT VFR BLD AUTO: 37.7 % (ref 34–48)
HGB BLD-MCNC: 12.5 G/DL (ref 11.5–15.5)
IMM GRANULOCYTES # BLD AUTO: <0.03 K/UL (ref 0–0.58)
IMM GRANULOCYTES NFR BLD: 0 % (ref 0–5)
LYMPHOCYTES NFR BLD: 1.49 K/UL (ref 1.5–4)
LYMPHOCYTES RELATIVE PERCENT: 25 % (ref 20–42)
MCH RBC QN AUTO: 30.3 PG (ref 26–35)
MCHC RBC AUTO-ENTMCNC: 33.2 G/DL (ref 32–34.5)
MCV RBC AUTO: 91.3 FL (ref 80–99.9)
MONOCYTES NFR BLD: 0.68 K/UL (ref 0.1–0.95)
MONOCYTES NFR BLD: 11 % (ref 2–12)
NEUTROPHILS NFR BLD: 62 % (ref 43–80)
NEUTS SEG NFR BLD: 3.73 K/UL (ref 1.8–7.3)
PLATELET # BLD AUTO: 217 K/UL (ref 130–450)
PMV BLD AUTO: 9.7 FL (ref 7–12)
POTASSIUM SERPL-SCNC: 3.7 MMOL/L (ref 3.5–5)
RBC # BLD AUTO: 4.13 M/UL (ref 3.5–5.5)
SODIUM SERPL-SCNC: 140 MMOL/L (ref 132–146)
WBC OTHER # BLD: 6.1 K/UL (ref 4.5–11.5)

## 2025-03-27 PROCEDURE — 6360000002 HC RX W HCPCS: Performed by: NURSE PRACTITIONER

## 2025-03-27 PROCEDURE — APPSS30 APP SPLIT SHARED TIME 16-30 MINUTES

## 2025-03-27 PROCEDURE — 36415 COLL VENOUS BLD VENIPUNCTURE: CPT

## 2025-03-27 PROCEDURE — 2500000003 HC RX 250 WO HCPCS: Performed by: NURSE PRACTITIONER

## 2025-03-27 PROCEDURE — 6370000000 HC RX 637 (ALT 250 FOR IP): Performed by: INTERNAL MEDICINE

## 2025-03-27 PROCEDURE — 85025 COMPLETE CBC W/AUTO DIFF WBC: CPT

## 2025-03-27 PROCEDURE — 1200000000 HC SEMI PRIVATE

## 2025-03-27 PROCEDURE — 80048 BASIC METABOLIC PNL TOTAL CA: CPT

## 2025-03-27 PROCEDURE — 99233 SBSQ HOSP IP/OBS HIGH 50: CPT | Performed by: INTERNAL MEDICINE

## 2025-03-27 PROCEDURE — 99222 1ST HOSP IP/OBS MODERATE 55: CPT | Performed by: THORACIC SURGERY (CARDIOTHORACIC VASCULAR SURGERY)

## 2025-03-27 PROCEDURE — 6370000000 HC RX 637 (ALT 250 FOR IP): Performed by: NURSE PRACTITIONER

## 2025-03-27 RX ADMIN — AMLODIPINE BESYLATE 5 MG: 5 TABLET ORAL at 08:55

## 2025-03-27 RX ADMIN — ROSUVASTATIN CALCIUM 20 MG: 20 TABLET, FILM COATED ORAL at 22:52

## 2025-03-27 RX ADMIN — MULTIVITAMIN TABLET 1 TABLET: TABLET at 08:55

## 2025-03-27 RX ADMIN — CARVEDILOL 12.5 MG: 6.25 TABLET, FILM COATED ORAL at 22:53

## 2025-03-27 RX ADMIN — SODIUM CHLORIDE, PRESERVATIVE FREE 10 ML: 5 INJECTION INTRAVENOUS at 22:53

## 2025-03-27 RX ADMIN — VALSARTAN 320 MG: 80 TABLET, FILM COATED ORAL at 08:55

## 2025-03-27 RX ADMIN — HYDRALAZINE HYDROCHLORIDE 10 MG: 20 INJECTION INTRAMUSCULAR; INTRAVENOUS at 00:33

## 2025-03-27 RX ADMIN — POTASSIUM CHLORIDE 40 MEQ: 1500 TABLET, EXTENDED RELEASE ORAL at 08:55

## 2025-03-27 RX ADMIN — ASPIRIN 81 MG: 81 TABLET, COATED ORAL at 08:55

## 2025-03-27 RX ADMIN — CARVEDILOL 12.5 MG: 6.25 TABLET, FILM COATED ORAL at 08:55

## 2025-03-27 ASSESSMENT — PAIN SCALES - GENERAL
PAINLEVEL_OUTOF10: 0

## 2025-03-27 NOTE — PROGRESS NOTES
[facility]                                Progress note    Subjective:    Resting comfortably in bed  No acute issues or concerns overnight  Denies chest pain and shortness of breath.      Objective:    BP (!) 150/86   Pulse 78   Temp 98.4 °F (36.9 °C) (Temporal)   Resp 18   Ht 1.651 m (5' 5\")   Wt 81.7 kg (180 lb 1.6 oz)   SpO2 98%   BMI 29.97 kg/m²     In: 2152.1 [P.O.:1440; I.V.:712.1]  Out: 200   In: 2152.1   Out: 200 [Urine:200]    General appearance: NAD, conversant  HEENT: AT/NC, MMM  Neck: FROM, supple  Lungs: Clear to auscultation  CV: RRR, no MRGs  Vasc: Radial pulses 2+  Abdomen: Soft, non-tender; no masses or HSM  Extremities: No peripheral edema or digital cyanosis  Skin: no rash, lesions or ulcers  Psych: Alert and oriented to person, place and time  Neuro: Alert and interactive     Recent Labs     03/25/25  0820 03/26/25  0527 03/27/25  0453   WBC 6.0 5.9 6.1   HGB 13.9 12.3 12.5   HCT 41.4 37.2 37.7    218 217       Recent Labs     03/25/25  0820 03/26/25  0527 03/27/25  0453    140 140   K 3.4* 3.6 3.7    107 107   CO2 21* 21* 20*   BUN 18 25* 18   CREATININE 0.7 0.8 0.7   CALCIUM 9.1 8.8 8.7       Assessment:    Principal Problem:    Syncope, unspecified syncope type  Active Problems:    HTN (hypertension)    Syncope and collapse  Resolved Problems:    * No resolved hospital problems. *      Plan:  Patient is a 79-year-old female admitted to [facility] for  Syncope - recurrent episodes of syncope.  Does not appear to be vasovagal. No common trend.  Labs and exam normal. EKG NSR.  Head CT neg.  Possible elevated BP could trigger carotid sinus and lower HR, to cause syncope.  In ER, one HR 60s with elevated BP.  Otherwise HR stable. No further episodes in hospital.  May also consider lacunar infarct causing symptoms but neuro exam benign and be rare to cause specific symptoms.     Monitor overnight on telemetry.  Treat BP.  Orthostatics in AM. If no

## 2025-03-27 NOTE — PLAN OF CARE
Problem: Cardiovascular - Adult  Goal: Maintains optimal cardiac output and hemodynamic stability  3/26/2025 2338 by Heather Denton RN  Outcome: Progressing  3/26/2025 1053 by Diana Way RN  Outcome: Progressing  Goal: Absence of cardiac dysrhythmias or at baseline  3/26/2025 2338 by Heather Denton RN  Outcome: Progressing  3/26/2025 1053 by Diana Way RN  Outcome: Progressing     Problem: Chronic Conditions and Co-morbidities  Goal: Patient's chronic conditions and co-morbidity symptoms are monitored and maintained or improved  3/26/2025 2338 by Heather Denton RN  Outcome: Progressing  3/26/2025 1053 by Diana Way RN  Outcome: Progressing     Problem: Safety - Adult  Goal: Free from fall injury  3/26/2025 2338 by Heather Denton RN  Outcome: Progressing  3/26/2025 1053 by Diana Way RN  Outcome: Progressing     Problem: Pain  Goal: Verbalizes/displays adequate comfort level or baseline comfort level  3/26/2025 2338 by Heather Denton RN  Outcome: Progressing  3/26/2025 1053 by Diana Way RN  Outcome: Progressing

## 2025-03-27 NOTE — CONSULTS
CTS Consult    Patient name: Laura Nunez    Reason for consult:  Pericardial effusion    Primary Care Physician: Olu Byers DO    Date of service: 3/27/2025    Chief Complaint:  Syncope    HPI:  78 yo female who is admitted after complaints of syncope.  Pt was noted to be hypertensive initially which was treated with hydralazine.  She denies chest pain or SOB.  She underwent ECHO which revealed a moderate pericardial effusion.  Of note, she follows with cardiology at [facility] and has had a pericardial effusion which has been unchanged for 6 years.      Allergies: No Known Allergies    Home medications:    Current Facility-Administered Medications   Medication Dose Route Frequency Provider Last Rate Last Admin    0.9 % sodium chloride infusion   IntraVENous Continuous Coleman Fatima, APRN - CNP   Stopped at 03/27/25 0038    sodium chloride flush 0.9 % injection 5-40 mL  5-40 mL IntraVENous 2 times per day [facility], April, APRN - CNP   10 mL at 03/26/25 2056    sodium chloride flush 0.9 % injection 5-40 mL  5-40 mL IntraVENous PRN Deandra[place]arry[place], April, APRN - CNP        0.9 % sodium chloride infusion   IntraVENous PRN [facility], April, APRN - CNP        magnesium sulfate 2000 mg in 50 mL IVPB premix  2,000 mg IntraVENous PRN Deandra-Miguel A, April, APRN - CNP        enoxaparin (LOVENOX) injection 40 mg  40 mg SubCUTAneous Daily Storey-Cornelius, April, APRN - CNP   40 mg at 03/25/25 0819    polyethylene glycol (GLYCOLAX) packet 17 g  17 g Oral Daily PRN Sebastian-Miguel A, April, APRN - CNP        acetaminophen (TYLENOL) tablet 650 mg  650 mg Oral Q6H PRN Sebastian-Miguel A, April, APRN - CNP        Or    acetaminophen (TYLENOL) suppository 650 mg  650 mg Rectal Q6H PRN Sebastian-T[place], April, APRN - CNP        [Held by provider] 0.9 % sodium chloride infusion   IntraVENous Continuous DeandraTarrytown, April, APRN - CNP   Stopped at 03/25/25 0221    aspirin EC tablet 81 mg  81 mg Oral QAM

## 2025-03-27 NOTE — PROGRESS NOTES
Inpatient Cardiology Progress note     PATIENT IS BEING FOLLOWED FOR:Syncope, mod pericardial effusion, ? tamponade physiology    Laura Nunez is a 79 y.o. female who was seen 1 time inpatient by [facility] cardiology Dr. Reyes 2/2018 for chest pain and shortness of breath. More recently patient has follow-up with [facility] cardiology 4/2024 with no changes made at that time.      HPI:  Patient presented to ER 3/24/2025 with dizziness and syncopal episode.  Patient reported for the last month she has had multiple episodes of sudden syncope without warning.  She also reports associated dizziness worse when going from sitting to standing.  Patient was hypokalemic 3.3 and repleted.  Patient was significantly hypertensive and given IV labetalol, highest 240/113.  Initial orthostatic vitals x 3 or borderline positive with drop in systolic blood pressure with minimal change in heart rate.  Patient was given 1 L NaCl in ER.  Most recent orthostatic blood pressure today was positive with significant systolic drop and increase of >15 bpm.  Norvasc added to hypertensive regimen per primary service.  Echo was ordered by primary service which showed preserved EF and a moderate 1-2 circumferential pericardial effusion with some RA inversion with suggestion of tamponade physiology, thus cardiology was consulted.  3/26/2025: Patient started on 75 mL/hour NaCl.  CTS was consulted for consideration of therapeutic/diagnostic pericardial window.  3/27/2025: Patient lying in bed alert and oriented with no complaints at this time.  Awaiting CTS consultation.  Patient reports feeling at her baseline today.  Ortho's done this morning with drop in systolic blood pressure with no significant change of heart rate.  Patient was asymptomatic at that time.  Patient to be ambulated hallway.  Currently sinus rhythm in the 70s with PVCs.  Patient remains hypertensive.      Labs 3/27: Potassium 3.7, CO2 20, BUN 18, creatinine 0.7, glucose 91, CRP 4,  sed rate 12, TSH 3.48, WBC 6.1, H&H 12.5/37.7, platelet 217    Most recent VS 97.8, 18 respirations, 73 pulse, 153/81, 96% room air.    ROS:  Consist: Denies fevers, chills or night sweats  Heart: Denies chest pain, palpitations, lightheadedness, dizziness or syncope  Lungs: Denies SOB, cough, wheezing, orthopnea or PND  GI: Denies abdominal pain, vomiting or diarrhea    PHYSICAL EXAM:   BP (!) 153/81   Pulse 73   Temp 97.8 °F (36.6 °C) (Temporal)   Resp 16   Ht 1.651 m (5' 5\")   Wt 81.7 kg (180 lb 1.6 oz)   SpO2 96%   BMI 29.97 kg/m²    B/P Range last 24 hours: Systolic (24hrs), Av , Min:119 , Max:175    Diastolic (24hrs), Av, Min:70, Max:99    CONST: Well developed, well nourished who appears stated age. Awake, alert and cooperative. No apparent distress  HEENT:   Head- Normocephalic, atraumatic   Eyes- Conjunctivae pink, anicteric  Throat- Oral mucosa pink and moist  Neck-  No stridor, trachea midline, no jugular venous distention. No carotid bruit  CHEST: Chest symmetrical and non-tender to palpation. No accessory muscle use or intercostal retractions  RESPIRATORY:  Lung sounds - clear throughout fields   CARDIOVASCULAR:     Heart Inspection- shows no noted pulsations  Heart Palpation- no heaves or thrills; PMI is non-displaced   Heart Ausculation- Regular rate and rhythm, no murmur. No s3, s4 or rub   PV: No lower extremity edema. No varicosities. Pedal pulses palpable, no clubbing or cyanosis   ABDOMEN: Soft, non-tender to light palpation. Bowel sounds present. No palpable masses no organomegaly; no abdominal bruit  MS: Good muscle strength and tone. No atrophy or abnormal movements.   : Deferred  SKIN: Warm and dry no statis dermatitis or ulcers   NEURO / PSYCH: Oriented to person, place and time. Speech clear and appropriate. Follows all commands. Pleasant affect       Intake/Output Summary (Last 24 hours) at 3/27/2025 4797  Last data filed at 3/27/2025 6094  Gross per 24 hour   Intake

## 2025-03-27 NOTE — PROGRESS NOTES
Pt ambulated in the owens 250 feet, tolerated well. Denies SOB, CP, dizziness. BP after ambulating 155/76 HR 68. Cardiology updated          Electronically signed by SRAVANTHI DELGADILLO RN on 3/27/2025 at 12:06 PM

## 2025-03-27 NOTE — CARE COORDINATION
03/27/25 CM Note: Pt has been evaluated by thoracic surgery and per their note pt has had pericardial effusion x 6 yrs-is managed by [facility].  Since pt is asymptomatic then no intervention is needed at this time.  Providing all physicians clear pt then she could return home.  No needs identified for home DC plan. PCP Dr. [facility]ers [facility] in [place]. Son to transport home @ DC. Jennifer Adler RN  [phone]

## 2025-03-27 NOTE — FLOWSHEET NOTE
03/27/25 0807   Vital Signs   Orthostatic B/P and Pulse? Yes   Blood Pressure Lying 171/92   Pulse Lying 75 PER MINUTE   Blood Pressure Sitting 154/89   Pulse Sitting 75 PER MINUTE   Blood Pressure Standing 131/95   Pulse Standing 78 PER MINUTE

## 2025-03-28 VITALS
BODY MASS INDEX: 29.97 KG/M2 | HEIGHT: 65 IN | HEART RATE: 72 BPM | OXYGEN SATURATION: 94 % | TEMPERATURE: 97.4 F | RESPIRATION RATE: 16 BRPM | SYSTOLIC BLOOD PRESSURE: 140 MMHG | WEIGHT: 179.9 LBS | DIASTOLIC BLOOD PRESSURE: 80 MMHG

## 2025-03-28 LAB
EKG ATRIAL RATE: 67 BPM
EKG P AXIS: 23 DEGREES
EKG P-R INTERVAL: 210 MS
EKG Q-T INTERVAL: 444 MS
EKG QRS DURATION: 86 MS
EKG QTC CALCULATION (BAZETT): 469 MS
EKG R AXIS: 43 DEGREES
EKG T AXIS: 46 DEGREES
EKG VENTRICULAR RATE: 67 BPM

## 2025-03-28 PROCEDURE — 6360000002 HC RX W HCPCS: Performed by: NURSE PRACTITIONER

## 2025-03-28 PROCEDURE — 6370000000 HC RX 637 (ALT 250 FOR IP): Performed by: INTERNAL MEDICINE

## 2025-03-28 PROCEDURE — 2500000003 HC RX 250 WO HCPCS: Performed by: NURSE PRACTITIONER

## 2025-03-28 PROCEDURE — 6370000000 HC RX 637 (ALT 250 FOR IP): Performed by: NURSE PRACTITIONER

## 2025-03-28 PROCEDURE — 93242 EXT ECG>48HR<7D RECORDING: CPT

## 2025-03-28 RX ORDER — VALSARTAN 320 MG/1
320 TABLET ORAL DAILY
Qty: 30 TABLET | Refills: 3 | Status: SHIPPED | OUTPATIENT
Start: 2025-03-29

## 2025-03-28 RX ORDER — HYDRALAZINE HYDROCHLORIDE 20 MG/ML
10 INJECTION INTRAMUSCULAR; INTRAVENOUS EVERY 4 HOURS PRN
Status: DISCONTINUED | OUTPATIENT
Start: 2025-03-28 | End: 2025-03-28 | Stop reason: HOSPADM

## 2025-03-28 RX ORDER — AMLODIPINE BESYLATE 5 MG/1
5 TABLET ORAL DAILY
Qty: 30 TABLET | Refills: 3 | Status: SHIPPED | OUTPATIENT
Start: 2025-03-29

## 2025-03-28 RX ADMIN — CARVEDILOL 12.5 MG: 6.25 TABLET, FILM COATED ORAL at 08:49

## 2025-03-28 RX ADMIN — SODIUM CHLORIDE, PRESERVATIVE FREE 10 ML: 5 INJECTION INTRAVENOUS at 05:54

## 2025-03-28 RX ADMIN — ASPIRIN 81 MG: 81 TABLET, COATED ORAL at 08:49

## 2025-03-28 RX ADMIN — VALSARTAN 320 MG: 80 TABLET, FILM COATED ORAL at 08:53

## 2025-03-28 RX ADMIN — HYDRALAZINE HYDROCHLORIDE 10 MG: 20 INJECTION INTRAMUSCULAR; INTRAVENOUS at 05:54

## 2025-03-28 RX ADMIN — MULTIVITAMIN TABLET 1 TABLET: TABLET at 08:53

## 2025-03-28 RX ADMIN — AMLODIPINE BESYLATE 5 MG: 5 TABLET ORAL at 08:50

## 2025-03-28 RX ADMIN — SODIUM CHLORIDE, PRESERVATIVE FREE 10 ML: 5 INJECTION INTRAVENOUS at 08:50

## 2025-03-28 RX ADMIN — POTASSIUM CHLORIDE 40 MEQ: 1500 TABLET, EXTENDED RELEASE ORAL at 08:49

## 2025-03-28 ASSESSMENT — PAIN SCALES - GENERAL: PAINLEVEL_OUTOF10: 0

## 2025-03-28 NOTE — PLAN OF CARE
Problem: Cardiovascular - Adult  Goal: Maintains optimal cardiac output and hemodynamic stability  3/28/2025 1424 by Merry Bird RN  Outcome: Adequate for Discharge  3/28/2025 0141 by eHather Denton RN  Outcome: Progressing  Goal: Absence of cardiac dysrhythmias or at baseline  3/28/2025 1424 by Merry Bird RN  Outcome: Adequate for Discharge  3/28/2025 0141 by Heather Denton RN  Outcome: Progressing     Problem: Chronic Conditions and Co-morbidities  Goal: Patient's chronic conditions and co-morbidity symptoms are monitored and maintained or improved  3/28/2025 1424 by Merry Bird RN  Outcome: Adequate for Discharge  3/28/2025 0141 by Heather Denton RN  Outcome: Progressing     Problem: Safety - Adult  Goal: Free from fall injury  3/28/2025 1424 by Merry Bird RN  Outcome: Adequate for Discharge  3/28/2025 0141 by Heather Denton RN  Outcome: Progressing     Problem: Pain  Goal: Verbalizes/displays adequate comfort level or baseline comfort level  3/28/2025 1424 by Merry Bird RN  Outcome: Adequate for Discharge  3/28/2025 0141 by Heather Denton RN  Outcome: Progressing

## 2025-03-28 NOTE — PLAN OF CARE
Problem: Cardiovascular - Adult  Goal: Maintains optimal cardiac output and hemodynamic stability  Outcome: Progressing  Goal: Absence of cardiac dysrhythmias or at baseline  Outcome: Progressing     Problem: Chronic Conditions and Co-morbidities  Goal: Patient's chronic conditions and co-morbidity symptoms are monitored and maintained or improved  Outcome: Progressing     Problem: Safety - Adult  Goal: Free from fall injury  Outcome: Progressing     Problem: Pain  Goal: Verbalizes/displays adequate comfort level or baseline comfort level  Outcome: Progressing

## 2025-03-28 NOTE — PROGRESS NOTES
CLINICAL PHARMACY NOTE: MEDS TO BEDS    Total # of Prescriptions Filled: 2   The following medications were delivered to the patient:  Amlodipine 5 mg  Valsartan 320 mg    Additional Documentation: daughter Deepali picked up in pharmacy

## 2025-03-28 NOTE — DISCHARGE SUMMARY
[facility]   Discharge summary   Patient ID:  Laura Nunez  [id number]  79 y.o.  1945    Admit date: 3/24/2025    Discharge date and time: 3/28/2025    Admission Diagnoses:   Patient Active Problem List   Diagnosis    Left carotid stenosis    Pericardial effusion    HTN (hypertension)    Chest pain    Bruit of right carotid artery    Syncope and collapse    Weakness    Electrolyte abnormality    Syncope, unspecified syncope type       Discharge Diagnoses: ***    Consults: {consultation:31818}    Procedures: ***    Hospital Course: The patient is a 79 y.o. female of Olu Byers DO with significant past medical history of *** who presents with ***    Recent Labs     03/26/25 0527 03/27/25  0453   WBC 5.9 6.1   HGB 12.3 12.5   HCT 37.2 37.7    217       Recent Labs     03/26/25 0527 03/27/25  0453    140   K 3.6 3.7    107   CO2 21* 20*   BUN 25* 18   CREATININE 0.8 0.7   CALCIUM 8.8 8.7       Echo (TTE) complete (PRN contrast/bubble/strain/3D)  Result Date: 3/25/2025    Left Ventricle: Normal left ventricular systolic function with a visually estimated EF of 55 - 60%. Left ventricle size is normal. Normal wall thickness. Normal wall motion. Indeterminate diastolic function.   Right Ventricle: Right ventricle size is normal. Normal systolic function.   Aortic Valve: Mild stenosis of the aortic valve. AV mean gradient is 11 mmHg. AV area by continuity VTI is 1.6 cm2.   Tricuspid Valve: Mild to moderate regurgitation. Normal RVSP. Est RA pressure is 8 mmHg. The estimated PASP is 32 mmHg.   Left Atrium: Left atrium is moderately dilated.   Right Atrium: Right atrium is mildly dilated.   Pericardium: There is evidence of epicardial fat. Moderate (1-2 cm) circumferential pericardial effusion present. No indication of cardiac tamponade.  There is evidence of right atrial inversion suggestive of tamponade physiology   Image quality is good.     CT HEAD WO CONTRAST  Result Date:  3/24/2025  EXAMINATION: CT OF THE HEAD WITHOUT CONTRAST  3/24/2025 8:23 pm TECHNIQUE: CT of the head was performed without the administration of intravenous contrast. Automated exposure control, iterative reconstruction, and/or weight based adjustment of the mA/kV was utilized to reduce the radiation dose to as low as reasonably achievable. COMPARISON: None. HISTORY: ORDERING SYSTEM PROVIDED HISTORY: dizziness TECHNOLOGIST PROVIDED HISTORY: Reason for exam:->dizziness Has a \"code stroke\" or \"stroke alert\" been called?->No Decision Support Exception - unselect if not a suspected or confirmed emergency medical condition->Emergency Medical Condition (MA) What reading provider will be dictating this exam?->CRC FINDINGS: BRAIN/VENTRICLES: There is no acute intracranial hemorrhage, mass effect or midline shift.  No abnormal extra-axial fluid collection.  The gray-white differentiation is maintained without evidence of an acute infarct.  There is no evidence of hydrocephalus.   Periventricular white matter changes consistent chronic microvascular. ORBITS: The visualized portion of the orbits demonstrate no acute abnormality. SINUSES: The visualized paranasal sinuses and mastoid air cells demonstrate no acute abnormality. SOFT TISSUES/SKULL:  No acute abnormality of the visualized skull or soft tissues.     No acute intracranial abnormality.     XR CHEST (2 VW)  Result Date: 3/24/2025  EXAMINATION: TWO XRAY VIEWS OF THE CHEST 3/24/2025 7:31 pm COMPARISON: None. HISTORY: ORDERING SYSTEM PROVIDED HISTORY: dizziness TECHNOLOGIST PROVIDED HISTORY: Reason for exam:->dizziness FINDINGS: The lungs are without acute focal process.  There is no effusion or pneumothorax.  Cardiomegaly.  The osseous structures are without acute process.     No acute process.       Discharge Exam:    HEENT: NCAT,  PERRLA, No JVD  Heart:  RRR, no murmurs, gallops, or rubs.  Lungs:  CTA bilaterally, no wheeze, rales or rhonchi  Abd: bowel sounds present,

## 2025-03-28 NOTE — PROGRESS NOTES
No chest pain, shortness of breath, dizziness or syncope    RRR, CTABL, no edema    Plan:  Can be discharged  Zio patch

## 2025-03-28 NOTE — CARE COORDINATION
03/28/25 CM Note: DC order noted.  Met with patient to ensure no needs for DC.  Daughter at bedside and no needs identified. Jennifer Adler RN CM

## 2025-03-28 NOTE — PROGRESS NOTES
Patient ambulated on room air at 95% with 88HR. Patient states she has shortness of breath at times after ambulating, patient recovered on room air at 95%.

## 2025-03-28 NOTE — PROGRESS NOTES
Applied Zio  monitor for 14 days per physician order.  Package Serial #[id number].   Instructed patient to:  avoid showering in the first 24 hours.   Press the button on the monitor when you feel a symptom and write it in your diary.   Do not submerge in water.   No lotion or powder near the patch.   Please return the monitor in the prepaid box/envelope provided w/device.     Patient verbalized understanding.     CB notified to register the device.

## 2025-05-02 DIAGNOSIS — R55 VASOVAGAL SYNCOPE: ICD-10-CM

## 2025-06-27 ENCOUNTER — HOSPITAL ENCOUNTER (OUTPATIENT)
Age: 80
Discharge: HOME | End: 2025-06-29

## 2025-06-27 PROCEDURE — 88305 TISSUE EXAM BY PATHOLOGIST: CPT

## 2025-07-02 LAB — SURGICAL PATHOLOGY REPORT: NORMAL
